# Patient Record
Sex: MALE | Race: WHITE | URBAN - METROPOLITAN AREA
[De-identification: names, ages, dates, MRNs, and addresses within clinical notes are randomized per-mention and may not be internally consistent; named-entity substitution may affect disease eponyms.]

---

## 2017-06-05 ENCOUNTER — DOCTOR'S OFFICE (OUTPATIENT)
Dept: URBAN - METROPOLITAN AREA CLINIC 136 | Facility: CLINIC | Age: 82
Setting detail: OPHTHALMOLOGY
End: 2017-06-05
Payer: COMMERCIAL

## 2017-06-05 DIAGNOSIS — H25.89: ICD-10-CM

## 2017-06-05 DIAGNOSIS — Z96.1: ICD-10-CM

## 2017-06-05 DIAGNOSIS — H35.3131: ICD-10-CM

## 2017-06-05 DIAGNOSIS — H04.121: ICD-10-CM

## 2017-06-05 DIAGNOSIS — H04.122: ICD-10-CM

## 2017-06-05 DIAGNOSIS — H35.371: ICD-10-CM

## 2017-06-05 PROCEDURE — 83861 MICROFLUID ANALY TEARS: CPT | Performed by: OPHTHALMOLOGY

## 2017-06-05 PROCEDURE — 92014 COMPRE OPH EXAM EST PT 1/>: CPT | Performed by: OPHTHALMOLOGY

## 2017-06-05 ASSESSMENT — REFRACTION_OUTSIDERX
OD_VA1: 20/25-2
OD_ADD: +2.50
OS_VA1: 20/25-2
OS_SPHERE: -0.50
OS_AXIS: 10
OS_CYLINDER: +0.50
OD_VA2: 20/25(J1)
OS_VA3: 20/
OD_SPHERE: -0.50
OU_VA: 20/25
OD_CYLINDER: +0.75
OD_AXIS: 115
OS_VA2: 20/25(J1)
OS_ADD: +2.50
OD_VA3: 20/

## 2017-06-05 ASSESSMENT — REFRACTION_AUTOREFRACTION
OS_CYLINDER: -2.00
OS_AXIS: 89
OD_SPHERE: -0.50
OS_SPHERE: +1.00
OD_CYLINDER: -0.75
OD_AXIS: 18

## 2017-06-05 ASSESSMENT — REFRACTION_MANIFEST
OS_VA1: 20/
OS_VA2: 20/
OD_VA3: 20/
OD_VA2: 20/
OD_VA1: 20/
OD_VA3: 20/
OD_VA1: 20/
OS_VA1: 20/
OS_VA3: 20/
OD_VA2: 20/
OS_VA2: 20/
OU_VA: 20/
OS_VA3: 20/
OU_VA: 20/

## 2017-06-05 ASSESSMENT — REFRACTION_CURRENTRX
OS_OVR_VA: 20/
OS_ADD: +3.00
OS_VPRISM_DIRECTION: BF
OD_VPRISM_DIRECTION: BF
OD_CYLINDER: -0.50
OS_OVR_VA: 20/
OD_ADD: +3.00
OD_OVR_VA: 20/
OD_AXIS: 050
OD_SPHERE: +1.50
OS_CYLINDER: -0.50
OS_SPHERE: PLANO
OS_AXIS: 110
OD_OVR_VA: 20/
OD_VPRISM: BD
OS_OVR_VA: 20/
OD_OVR_VA: 20/

## 2017-06-05 ASSESSMENT — LID POSITION - DERMATOCHALASIS
OS_DERMATOCHALASIS: LUL
OD_DERMATOCHALASIS: RUL

## 2017-06-05 ASSESSMENT — DRY EYES - PHYSICIAN NOTES
OD_GENERALCOMMENTS: KSICCA
OS_GENERALCOMMENTS: KSICCA

## 2017-06-05 ASSESSMENT — VISUAL ACUITY
OD_BCVA: 20/40
OS_BCVA: 20/30-1

## 2017-06-05 ASSESSMENT — CONFRONTATIONAL VISUAL FIELD TEST (CVF)
OD_FINDINGS: FULL
OS_FINDINGS: FULL

## 2017-06-05 ASSESSMENT — SPHEQUIV_DERIVED
OD_SPHEQUIV: -0.875
OS_SPHEQUIV: 0

## 2017-06-12 ENCOUNTER — DOCTOR'S OFFICE (OUTPATIENT)
Dept: URBAN - METROPOLITAN AREA CLINIC 137 | Facility: CLINIC | Age: 82
Setting detail: OPHTHALMOLOGY
End: 2017-06-12

## 2017-06-12 DIAGNOSIS — H52.4: ICD-10-CM

## 2017-06-12 PROCEDURE — 92015 DETERMINE REFRACTIVE STATE: CPT | Performed by: OPHTHALMOLOGY

## 2017-06-12 ASSESSMENT — REFRACTION_CURRENTRX
OD_CYLINDER: +0.75
OD_VPRISM_DIRECTION: BF
OS_OVR_VA: 20/
OD_OVR_VA: 20/
OS_SPHERE: -0.50
OD_AXIS: 116
OS_ADD: +2.75
OS_AXIS: 010
OS_OVR_VA: 20/
OS_OVR_VA: 20/
OD_OVR_VA: 20/
OD_OVR_VA: 20/
OS_CYLINDER: +0.50
OD_ADD: +2.75
OS_VPRISM_DIRECTION: BF
OD_SPHERE: -0.50

## 2017-06-12 ASSESSMENT — REFRACTION_OUTSIDERX
OS_CYLINDER: +0.50
OS_ADD: +2.50
OD_VA2: 20/20(J1+)
OD_CYLINDER: +0.75
OD_ADD: +2.75
OS_VA2: 20/25(J1)
OS_CYLINDER: +0.50
OS_VA3: 20/
OD_VA1: 20/20
OD_AXIS: 115
OD_SPHERE: -0.50
OS_SPHERE: -0.50
OD_ADD: +2.50
OS_AXIS: 10
OS_ADD: +2.75
OS_VA1: 20/25-2
OS_VA1: 20/20
OS_VA2: 20/20(J1+)
OS_VA3: 20/
OS_AXIS: 010
OD_SPHERE: -0.50
OD_CYLINDER: +0.75
OD_VA3: 20/
OU_VA: 20/25
OD_VA2: 20/25(J1)
OD_AXIS: 115
OU_VA: 20/
OD_VA3: 20/
OD_VA1: 20/25-2
OS_SPHERE: -0.50

## 2017-06-12 ASSESSMENT — REFRACTION_MANIFEST
OU_VA: 20/
OS_VA1: 20/
OD_VA3: 20/
OD_VA2: 20/
OD_VA1: 20/
OS_VA2: 20/
OS_VA3: 20/

## 2017-06-12 ASSESSMENT — REFRACTION_AUTOREFRACTION
OS_AXIS: 173
OD_SPHERE: -0.75
OS_SPHERE: -0.25
OD_CYLINDER: +1.00
OD_AXIS: 081
OS_CYLINDER: +1.25

## 2017-06-12 ASSESSMENT — KERATOMETRY
OD_AXISANGLE_DEGREES: 171
OD_K1POWER_DIOPTERS: 42.75
OD_K2POWER_DIOPTERS: 45.00
OS_K2POWER_DIOPTERS: 43.25
OS_AXISANGLE_DEGREES: 010
OS_K1POWER_DIOPTERS: 42.75

## 2017-06-12 ASSESSMENT — SPHEQUIV_DERIVED
OS_SPHEQUIV: 0.375
OD_SPHEQUIV: -0.25

## 2017-06-12 ASSESSMENT — VISUAL ACUITY
OD_BCVA: 20/20
OS_BCVA: 20/20-2

## 2017-06-12 ASSESSMENT — AXIALLENGTH_DERIVED
OD_AL: 23.5516
OS_AL: 23.6295

## 2017-06-20 ENCOUNTER — OPTICAL (OUTPATIENT)
Dept: URBAN - METROPOLITAN AREA CLINIC 144 | Facility: CLINIC | Age: 82
Setting detail: OPHTHALMOLOGY
End: 2017-06-20
Payer: COMMERCIAL

## 2017-06-20 DIAGNOSIS — Z96.1: ICD-10-CM

## 2017-06-20 PROCEDURE — V2203 LENS SPHCYL BIFOCAL 4.00D/.1: HCPCS | Performed by: OPHTHALMOLOGY

## 2017-06-30 ENCOUNTER — DOCTOR'S OFFICE (OUTPATIENT)
Dept: URBAN - METROPOLITAN AREA CLINIC 136 | Facility: CLINIC | Age: 82
Setting detail: OPHTHALMOLOGY
End: 2017-06-30
Payer: COMMERCIAL

## 2017-06-30 DIAGNOSIS — H17.9: ICD-10-CM

## 2017-06-30 DIAGNOSIS — H04.121: ICD-10-CM

## 2017-06-30 DIAGNOSIS — H35.3131: ICD-10-CM

## 2017-06-30 DIAGNOSIS — H04.122: ICD-10-CM

## 2017-06-30 DIAGNOSIS — H43.813: ICD-10-CM

## 2017-06-30 DIAGNOSIS — H43.812: ICD-10-CM

## 2017-06-30 DIAGNOSIS — H43.811: ICD-10-CM

## 2017-06-30 DIAGNOSIS — Z96.1: ICD-10-CM

## 2017-06-30 PROCEDURE — 92225 OPHTHALMOSCOPY EXTENDED INITIAL: CPT | Performed by: OPHTHALMOLOGY

## 2017-06-30 PROCEDURE — 92134 CPTRZ OPH DX IMG PST SGM RTA: CPT | Performed by: OPHTHALMOLOGY

## 2017-06-30 PROCEDURE — 92014 COMPRE OPH EXAM EST PT 1/>: CPT | Performed by: OPHTHALMOLOGY

## 2017-06-30 ASSESSMENT — REFRACTION_OUTSIDERX
OD_CYLINDER: +0.75
OS_AXIS: 10
OD_AXIS: 115
OS_CYLINDER: +0.50
OD_VA2: 20/20(J1+)
OS_VA1: 20/20
OS_VA3: 20/
OD_ADD: +2.75
OD_VA2: 20/25(J1)
OD_VA3: 20/
OU_VA: 20/25
OD_VA3: 20/
OS_ADD: +2.75
OD_SPHERE: -0.50
OD_CYLINDER: +0.75
OS_CYLINDER: +0.50
OS_ADD: +2.50
OU_VA: 20/
OS_AXIS: 010
OD_VA1: 20/20
OD_VA1: 20/25-2
OD_AXIS: 115
OD_SPHERE: -0.50
OS_SPHERE: -0.50
OD_ADD: +2.50
OS_SPHERE: -0.50
OS_VA2: 20/20(J1+)
OS_VA1: 20/25-2
OS_VA3: 20/
OS_VA2: 20/25(J1)

## 2017-06-30 ASSESSMENT — REFRACTION_MANIFEST
OS_VA3: 20/
OU_VA: 20/
OS_VA2: 20/
OS_VA1: 20/
OD_VA1: 20/
OD_VA3: 20/
OD_VA2: 20/

## 2017-06-30 ASSESSMENT — CONFRONTATIONAL VISUAL FIELD TEST (CVF)
OD_FINDINGS: FULL
OS_FINDINGS: FULL

## 2017-06-30 ASSESSMENT — SPHEQUIV_DERIVED
OS_SPHEQUIV: 0.375
OD_SPHEQUIV: -0.25

## 2017-06-30 ASSESSMENT — REFRACTION_CURRENTRX
OS_OVR_VA: 20/
OD_OVR_VA: 20/
OD_VPRISM_DIRECTION: BF
OS_VPRISM_DIRECTION: BF
OS_CYLINDER: +0.50
OS_ADD: +2.75
OS_AXIS: 010
OD_OVR_VA: 20/
OD_OVR_VA: 20/
OD_AXIS: 116
OD_SPHERE: -0.50
OD_ADD: +2.75
OS_SPHERE: -0.50
OS_OVR_VA: 20/
OS_OVR_VA: 20/
OD_CYLINDER: +0.75

## 2017-06-30 ASSESSMENT — CORNEAL SURGICAL SCARRING: OD_SCARRING: ANTERIOR STROMAL

## 2017-06-30 ASSESSMENT — REFRACTION_AUTOREFRACTION
OD_AXIS: 081
OS_CYLINDER: +1.25
OD_CYLINDER: +1.00
OS_SPHERE: -0.25
OD_SPHERE: -0.75
OS_AXIS: 173

## 2017-06-30 ASSESSMENT — LID POSITION - DERMATOCHALASIS
OS_DERMATOCHALASIS: LUL
OD_DERMATOCHALASIS: RUL

## 2017-06-30 ASSESSMENT — DRY EYES - PHYSICIAN NOTES
OD_GENERALCOMMENTS: KSICCA
OS_GENERALCOMMENTS: KSICCA

## 2017-06-30 ASSESSMENT — VISUAL ACUITY
OS_BCVA: 20/20-3
OD_BCVA: 20/20-1

## 2017-09-05 ENCOUNTER — ALLSCRIPTS OFFICE VISIT (OUTPATIENT)
Dept: OTHER | Facility: OTHER | Age: 82
End: 2017-09-05

## 2017-09-18 ENCOUNTER — DOCTOR'S OFFICE (OUTPATIENT)
Dept: URBAN - METROPOLITAN AREA CLINIC 137 | Facility: CLINIC | Age: 82
Setting detail: OPHTHALMOLOGY
End: 2017-09-18
Payer: COMMERCIAL

## 2017-09-18 DIAGNOSIS — H17.9: ICD-10-CM

## 2017-09-18 DIAGNOSIS — Z96.1: ICD-10-CM

## 2017-09-18 DIAGNOSIS — H04.123: ICD-10-CM

## 2017-09-18 DIAGNOSIS — H11.31: ICD-10-CM

## 2017-09-18 PROCEDURE — 99213 OFFICE O/P EST LOW 20 MIN: CPT | Performed by: OPHTHALMOLOGY

## 2017-09-18 ASSESSMENT — CONFRONTATIONAL VISUAL FIELD TEST (CVF)
OD_FINDINGS: FULL
OS_FINDINGS: FULL

## 2017-09-18 ASSESSMENT — REFRACTION_OUTSIDERX
OD_ADD: +2.50
OD_SPHERE: -0.50
OD_VA2: 20/20(J1+)
OS_CYLINDER: +0.50
OD_VA1: 20/20
OD_AXIS: 115
OD_VA1: 20/25-2
OS_AXIS: 10
OD_SPHERE: -0.50
OD_AXIS: 115
OS_ADD: +2.75
OS_SPHERE: -0.50
OD_CYLINDER: +0.75
OU_VA: 20/
OS_AXIS: 010
OS_VA3: 20/
OS_ADD: +2.50
OS_VA3: 20/
OD_ADD: +2.75
OS_VA1: 20/25-2
OU_VA: 20/25
OD_VA3: 20/
OD_VA2: 20/25(J1)
OS_VA2: 20/25(J1)
OD_VA3: 20/
OD_CYLINDER: +0.75
OS_SPHERE: -0.50
OS_CYLINDER: +0.50
OS_VA1: 20/20
OS_VA2: 20/20(J1+)

## 2017-09-18 ASSESSMENT — REFRACTION_MANIFEST
OU_VA: 20/
OD_VA2: 20/
OD_VA3: 20/
OS_VA3: 20/
OD_VA1: 20/
OS_VA1: 20/
OS_VA2: 20/

## 2017-09-18 ASSESSMENT — REFRACTION_CURRENTRX
OS_ADD: +2.75
OD_OVR_VA: 20/
OD_ADD: +2.75
OD_SPHERE: -0.50
OS_VPRISM_DIRECTION: BF
OS_OVR_VA: 20/
OS_AXIS: 010
OD_CYLINDER: +0.75
OS_OVR_VA: 20/
OS_OVR_VA: 20/
OD_VPRISM_DIRECTION: BF
OD_AXIS: 116
OD_OVR_VA: 20/
OS_SPHERE: -0.50
OS_CYLINDER: +0.50
OD_OVR_VA: 20/

## 2017-09-18 ASSESSMENT — REFRACTION_AUTOREFRACTION
OS_SPHERE: -0.25
OS_AXIS: 173
OS_CYLINDER: +1.25
OD_AXIS: 081
OD_SPHERE: -0.75
OD_CYLINDER: +1.00

## 2017-09-18 ASSESSMENT — DRY EYES - PHYSICIAN NOTES
OD_GENERALCOMMENTS: KSICCA
OS_GENERALCOMMENTS: KSICCA

## 2017-09-18 ASSESSMENT — VISUAL ACUITY
OS_BCVA: 20/25-2
OD_BCVA: 20/25-2

## 2017-09-18 ASSESSMENT — SPHEQUIV_DERIVED
OD_SPHEQUIV: -0.25
OS_SPHEQUIV: 0.375

## 2017-09-18 ASSESSMENT — LID POSITION - DERMATOCHALASIS
OD_DERMATOCHALASIS: RUL
OS_DERMATOCHALASIS: LUL

## 2017-09-18 ASSESSMENT — CORNEAL SURGICAL SCARRING: OD_SCARRING: ANTERIOR STROMAL

## 2018-01-15 VITALS
WEIGHT: 144 LBS | DIASTOLIC BLOOD PRESSURE: 62 MMHG | HEART RATE: 70 BPM | HEIGHT: 67 IN | RESPIRATION RATE: 16 BRPM | SYSTOLIC BLOOD PRESSURE: 120 MMHG | BODY MASS INDEX: 22.6 KG/M2

## 2018-01-16 NOTE — MISCELLANEOUS
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)    Current Meds   1  AmLODIPine Besylate 5 MG Oral Tablet; Take 1 tablet daily; Last Rx:10Oct2016 Ordered   2  Atenolol 25 MG Oral Tablet; TAKE 1 TABLET DAILY AS DIRECTED; Therapy: (Alba Meyer) to Recorded   3  Doxazosin Mesylate 2 MG Oral Tablet; TAKE 1 TABLET Bedtime; Therapy: [id number] to (Evaluate:08Apr2017)  Requested for: [id number]; Last   Rx:10Oct2016 Ordered   4  Folic Acid 1 MG Oral Tablet; TAKE 1 TABLET DAILY AS DIRECTED; Therapy: (Alba Meyer) to Recorded   5  Lipitor 10 MG Oral Tablet (Atorvastatin Calcium); TAKE 1 TABLET DAILY AS DIRECTED; Therapy: (Alba Meyer) to Recorded   6  Multi-Vitamin TABS; Therapy: (Alba Meyre) to Recorded   7  RaNITidine HCl - 150 MG Oral Capsule; take two capsules once daily; Therapy: (Alba Meyer) to Recorded    Allergies    1  No Known Drug Allergies    2  IV Dye    Signatures   Electronically signed by :  AMY Forman ; Oct 21 2016  9:26AM EST

## 2018-02-26 ENCOUNTER — OFFICE VISIT (OUTPATIENT)
Dept: NEPHROLOGY | Facility: CLINIC | Age: 83
End: 2018-02-26
Payer: MEDICARE

## 2018-02-26 VITALS
SYSTOLIC BLOOD PRESSURE: 140 MMHG | DIASTOLIC BLOOD PRESSURE: 84 MMHG | HEIGHT: 67 IN | WEIGHT: 137.4 LBS | BODY MASS INDEX: 21.56 KG/M2 | HEART RATE: 70 BPM

## 2018-02-26 DIAGNOSIS — N18.9 CHRONIC RENAL IMPAIRMENT, UNSPECIFIED CKD STAGE: Primary | ICD-10-CM

## 2018-02-26 PROCEDURE — 99213 OFFICE O/P EST LOW 20 MIN: CPT | Performed by: INTERNAL MEDICINE

## 2018-02-26 RX ORDER — AMLODIPINE BESYLATE 2.5 MG/1
5 TABLET ORAL DAILY
COMMUNITY
End: 2022-05-12 | Stop reason: SDUPTHER

## 2018-02-26 RX ORDER — FOLIC ACID 1 MG/1
800 TABLET ORAL DAILY
COMMUNITY

## 2018-02-26 RX ORDER — RANITIDINE 15 MG/ML
15 SOLUTION ORAL 2 TIMES DAILY
COMMUNITY
End: 2020-05-28 | Stop reason: CLARIF

## 2018-02-26 RX ORDER — DOXAZOSIN 2 MG/1
2 TABLET ORAL
COMMUNITY
End: 2020-05-28 | Stop reason: CLARIF

## 2018-02-26 RX ORDER — ATORVASTATIN CALCIUM 10 MG/1
10 TABLET, FILM COATED ORAL DAILY
COMMUNITY

## 2018-02-26 RX ORDER — ATENOLOL 50 MG/1
50 TABLET ORAL DAILY
COMMUNITY

## 2018-02-26 NOTE — LETTER
February 26, 2018     Shawna Ferrari DO  [address] [employer]    Patient: Dyllan Rock   YOB: 1930   Date of Visit: 2/26/2018       Dear Dr Lizeth Roman:    Thank you for referring Dyllan Rock to me for evaluation  Below are my notes for this consultation  If you have questions, please do not hesitate to call me  I look forward to following your patient along with you  Sincerely,        Neal Wiggins MD        CC: No Recipients  Neal Wiggins MD  2/26/2018  3:57 PM  Sign at close encounter  [facility]    Dyllan Rock 80 y o  male MRN: [id number]  Unit/Bed#:  Encounter: [id number]  Reason for Consult:  Chronic renal insufficiency    The patient is here for routine follow-up he has been doing well  Since I have last seen and he did undergo urologic procedure and was said to have a lot of scar tissue in the bladder neck and had that dilated  He states presently does not really notice much difference but it could take a couple months to see the full effect  He says his stream is a little bit stronger when he urinates  Otherwise no complaints today  ASSESSMENT/PLAN:  1  Renal     Patient's 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  He has never had an active urine sediment or a lot of proteinuria  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 given his advanced age %period% creatinine is stable at 2 so there has been no significant progression since last seen  At this point continue current medications and monitor urination as hopefully this will improve as the surgical procedure heels  SUBJECTIVE:  Review of Systems   Constitution: Negative for chills, fever and weakness  HENT: Negative  Eyes: Negative  Cardiovascular: Negative  Respiratory: Negative  Gastrointestinal: Negative  Genitourinary: Positive for incomplete emptying  Negative for dysuria         OBJECTIVE:  Current Weight: Weight - Scale: 62 3 kg (137 lb 6 4 oz)  [email or web address]:     Blood pressure 140/84, pulse 70, height 5' 7" (1 702 m), weight 62 3 kg (137 lb 6 4 oz)  , Body mass index is 21 52 kg/m²  [unfilled]    Physical Exam: /84 (BP Location: Right arm, Patient Position: Sitting, Cuff Size: Standard)   Pulse 70   Ht 5' 7" (1 702 m)   Wt 62 3 kg (137 lb 6 4 oz)   BMI 21 52 kg/m²    Physical Exam   Constitutional: He is oriented to person, place, and time  No distress  HENT:   Mouth/Throat: No oropharyngeal exudate  Eyes: No scleral icterus  Neck: Neck supple  No JVD present  Cardiovascular: Normal rate and regular rhythm  Exam reveals no friction rub  No edema   Pulmonary/Chest: Effort normal and breath sounds normal  No respiratory distress  He has no wheezes  He has no rales  Abdominal: Soft  Bowel sounds are normal  He exhibits no distension  There is no tenderness  There is no rebound  Neurological: He is alert and oriented to person, place, and time         Medications:    Current Outpatient Prescriptions:     amLODIPine (NORVASC) 2 5 mg tablet, Take 1 mg by mouth daily, Disp: , Rfl:     atenolol (TENORMIN) 50 mg tablet, Take 50 mg by mouth daily, Disp: , Rfl:     atorvastatin (LIPITOR) 10 mg tablet, Take 10 mg by mouth daily, Disp: , Rfl:     doxazosin (CARDURA) 2 mg tablet, Take 2 mg by mouth daily at bedtime, Disp: , Rfl:     folic acid (FOLVITE) 1 mg tablet, Take 1 mg by mouth daily, Disp: , Rfl:     ranitidine (ZANTAC) 15 mg/mL syrup, Take 15 mg by mouth 2 (two) times a day, Disp: , Rfl:     Laboratory Results:  No results found for: WBC, HGB, HCT, MCV, PLT  No results found for: GLUCOSE, CALCIUM, NA, K, CO2, CL, BUN, CREATININE  No results found for: CALCIUM, PHOS  No results found for: LABPROT

## 2018-02-26 NOTE — PROGRESS NOTES
NEPHROLOGY PROGRESS NOTE    Kurtis Mcduffie 80 y o  male MRN: [id number]  Unit/Bed#:  Encounter: [id number]  Reason for Consult:  Chronic renal insufficiency    The patient is here for routine follow-up he has been doing well  Since I have last seen and he did undergo urologic procedure and was said to have a lot of scar tissue in the bladder neck and had that dilated  He states presently does not really notice much difference but it could take a couple months to see the full effect  He says his stream is a little bit stronger when he urinates  Otherwise no complaints today  ASSESSMENT/PLAN:  1  Renal     Patient's 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  He has never had an active urine sediment or a lot of proteinuria  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 given his advanced age %period% creatinine is stable at 2 so there has been no significant progression since last seen  At this point continue current medications and monitor urination as hopefully this will improve as the surgical procedure heels  SUBJECTIVE:  Review of Systems   Constitution: Negative for chills, fever and weakness  HENT: Negative  Eyes: Negative  Cardiovascular: Negative  Respiratory: Negative  Gastrointestinal: Negative  Genitourinary: Positive for incomplete emptying  Negative for dysuria  OBJECTIVE:  Current Weight: Weight - Scale: 62 3 kg (137 lb 6 4 oz)  [email or web address]:     Blood pressure 140/84, pulse 70, height 5' 7" (1 702 m), weight 62 3 kg (137 lb 6 4 oz)  , Body mass index is 21 52 kg/m²  [unfilled]    Physical Exam: /84 (BP Location: Right arm, Patient Position: Sitting, Cuff Size: Standard)   Pulse 70   Ht 5' 7" (1 702 m)   Wt 62 3 kg (137 lb 6 4 oz)   BMI 21 52 kg/m²   Physical Exam   Constitutional: He is oriented to person, place, and time  No distress  HENT:   Mouth/Throat: No oropharyngeal exudate     Eyes: No scleral icterus  Neck: Neck supple  No JVD present  Cardiovascular: Normal rate and regular rhythm  Exam reveals no friction rub  No edema   Pulmonary/Chest: Effort normal and breath sounds normal  No respiratory distress  He has no wheezes  He has no rales  Abdominal: Soft  Bowel sounds are normal  He exhibits no distension  There is no tenderness  There is no rebound  Neurological: He is alert and oriented to person, place, and time         Medications:    Current Outpatient Prescriptions:     amLODIPine (NORVASC) 2 5 mg tablet, Take 1 mg by mouth daily, Disp: , Rfl:     atenolol (TENORMIN) 50 mg tablet, Take 50 mg by mouth daily, Disp: , Rfl:     atorvastatin (LIPITOR) 10 mg tablet, Take 10 mg by mouth daily, Disp: , Rfl:     doxazosin (CARDURA) 2 mg tablet, Take 2 mg by mouth daily at bedtime, Disp: , Rfl:     folic acid (FOLVITE) 1 mg tablet, Take 1 mg by mouth daily, Disp: , Rfl:     ranitidine (ZANTAC) 15 mg/mL syrup, Take 15 mg by mouth 2 (two) times a day, Disp: , Rfl:     Laboratory Results:  No results found for: WBC, HGB, HCT, MCV, PLT  No results found for: GLUCOSE, CALCIUM, NA, K, CO2, CL, BUN, CREATININE  No results found for: CALCIUM, PHOS  No results found for: LABPROT

## 2018-02-26 NOTE — PATIENT INSTRUCTIONS
As we reviewed your creatinine which is the blood test for the kidney function was 2 and that has been stable at your baseline so overall it is not worse  Blood pressure is acceptable continue current medications and follow-up as scheduled

## 2018-03-01 DIAGNOSIS — N18.9 CHRONIC KIDNEY DISEASE: ICD-10-CM

## 2018-03-19 ENCOUNTER — DOCTOR'S OFFICE (OUTPATIENT)
Dept: URBAN - METROPOLITAN AREA CLINIC 136 | Facility: CLINIC | Age: 83
Setting detail: OPHTHALMOLOGY
End: 2018-03-19
Payer: COMMERCIAL

## 2018-03-19 DIAGNOSIS — H04.123: ICD-10-CM

## 2018-03-19 DIAGNOSIS — H25.89: ICD-10-CM

## 2018-03-19 DIAGNOSIS — H35.3131: ICD-10-CM

## 2018-03-19 DIAGNOSIS — H43.813: ICD-10-CM

## 2018-03-19 DIAGNOSIS — H04.122: ICD-10-CM

## 2018-03-19 DIAGNOSIS — H04.121: ICD-10-CM

## 2018-03-19 PROBLEM — H11.31 SUBCONJUCTIVAL HEMORRHAGE; RIGHT EYE: Status: RESOLVED | Noted: 2017-09-18 | Resolved: 2018-03-19

## 2018-03-19 PROCEDURE — 92134 CPTRZ OPH DX IMG PST SGM RTA: CPT | Performed by: OPHTHALMOLOGY

## 2018-03-19 PROCEDURE — 92250 FUNDUS PHOTOGRAPHY W/I&R: CPT | Performed by: OPHTHALMOLOGY

## 2018-03-19 PROCEDURE — 83861 MICROFLUID ANALY TEARS: CPT | Performed by: OPHTHALMOLOGY

## 2018-03-19 PROCEDURE — 92014 COMPRE OPH EXAM EST PT 1/>: CPT | Performed by: OPHTHALMOLOGY

## 2018-03-19 ASSESSMENT — REFRACTION_CURRENTRX
OD_CYLINDER: +0.75
OD_ADD: +2.75
OS_SPHERE: -0.50
OD_VPRISM_DIRECTION: BF
OD_OVR_VA: 20/
OD_AXIS: 116
OD_OVR_VA: 20/
OS_OVR_VA: 20/
OD_OVR_VA: 20/
OS_CYLINDER: +0.50
OS_VPRISM_DIRECTION: BF
OS_OVR_VA: 20/
OS_AXIS: 010
OS_ADD: +2.75
OD_SPHERE: -0.50
OS_OVR_VA: 20/

## 2018-03-19 ASSESSMENT — REFRACTION_AUTOREFRACTION
OD_AXIS: 081
OS_CYLINDER: +1.25
OD_CYLINDER: +1.00
OD_SPHERE: -0.75
OS_SPHERE: -0.25
OS_AXIS: 173

## 2018-03-19 ASSESSMENT — CORNEAL SURGICAL SCARRING: OD_SCARRING: ANTERIOR STROMAL

## 2018-03-19 ASSESSMENT — DRY EYES - PHYSICIAN NOTES
OS_GENERALCOMMENTS: KSICCA
OD_GENERALCOMMENTS: KSICCA

## 2018-03-19 ASSESSMENT — REFRACTION_OUTSIDERX
OS_AXIS: 010
OS_ADD: +2.75
OS_VA3: 20/
OU_VA: 20/
OS_CYLINDER: +0.50
OD_VA2: 20/25(J1)
OS_VA1: 20/20
OS_SPHERE: -0.50
OS_AXIS: 10
OD_VA1: 20/25-2
OS_ADD: +2.50
OD_ADD: +2.50
OS_VA1: 20/25-2
OD_SPHERE: -0.50
OD_AXIS: 115
OD_SPHERE: -0.50
OD_VA1: 20/20
OD_AXIS: 115
OS_VA2: 20/20(J1+)
OU_VA: 20/25
OS_SPHERE: -0.50
OD_CYLINDER: +0.75
OD_ADD: +2.75
OD_CYLINDER: +0.75
OD_VA3: 20/
OS_VA2: 20/25(J1)
OD_VA2: 20/20(J1+)
OD_VA3: 20/
OS_VA3: 20/
OS_CYLINDER: +0.50

## 2018-03-19 ASSESSMENT — LID POSITION - DERMATOCHALASIS
OD_DERMATOCHALASIS: RUL
OS_DERMATOCHALASIS: LUL

## 2018-03-19 ASSESSMENT — VISUAL ACUITY
OD_BCVA: 20/25-2
OS_BCVA: 20/25-2

## 2018-03-19 ASSESSMENT — SUPERFICIAL PUNCTATE KERATITIS (SPK)
OD_SPK: T
OS_SPK: T

## 2018-03-19 ASSESSMENT — SPHEQUIV_DERIVED
OS_SPHEQUIV: 0.375
OD_SPHEQUIV: -0.25

## 2018-03-19 ASSESSMENT — CONFRONTATIONAL VISUAL FIELD TEST (CVF)
OS_FINDINGS: FULL
OD_FINDINGS: FULL

## 2018-03-19 ASSESSMENT — REFRACTION_MANIFEST
OD_VA2: 20/
OS_VA1: 20/
OD_VA1: 20/
OS_VA2: 20/
OD_VA3: 20/
OU_VA: 20/
OS_VA3: 20/

## 2018-06-28 ENCOUNTER — DOCTOR'S OFFICE (OUTPATIENT)
Dept: URBAN - METROPOLITAN AREA CLINIC 136 | Facility: CLINIC | Age: 83
Setting detail: OPHTHALMOLOGY
End: 2018-06-28
Payer: COMMERCIAL

## 2018-06-28 DIAGNOSIS — H04.123: ICD-10-CM

## 2018-06-28 DIAGNOSIS — H17.9: ICD-10-CM

## 2018-06-28 DIAGNOSIS — H25.89: ICD-10-CM

## 2018-06-28 DIAGNOSIS — Z96.1: ICD-10-CM

## 2018-06-28 DIAGNOSIS — H43.813: ICD-10-CM

## 2018-06-28 DIAGNOSIS — H35.3132: ICD-10-CM

## 2018-06-28 PROBLEM — H04.121 DRY EYE; RIGHT EYE, LEFT EYE: Status: ACTIVE | Noted: 2017-06-05

## 2018-06-28 PROBLEM — H04.122 DRY EYE; RIGHT EYE, LEFT EYE: Status: ACTIVE | Noted: 2017-06-05

## 2018-06-28 PROBLEM — H35.379 EPIRETINAL MEMBRANE: Status: ACTIVE | Noted: 2018-06-28

## 2018-06-28 PROCEDURE — 92014 COMPRE OPH EXAM EST PT 1/>: CPT | Performed by: OPHTHALMOLOGY

## 2018-06-28 PROCEDURE — 92134 CPTRZ OPH DX IMG PST SGM RTA: CPT | Performed by: OPHTHALMOLOGY

## 2018-06-28 ASSESSMENT — CORNEAL SURGICAL SCARRING: OD_SCARRING: ANTERIOR STROMAL

## 2018-06-28 ASSESSMENT — LID POSITION - DERMATOCHALASIS
OD_DERMATOCHALASIS: RUL
OS_DERMATOCHALASIS: LUL

## 2018-06-28 ASSESSMENT — SUPERFICIAL PUNCTATE KERATITIS (SPK)
OD_SPK: T
OS_SPK: T

## 2018-06-28 ASSESSMENT — CONFRONTATIONAL VISUAL FIELD TEST (CVF)
OD_FINDINGS: FULL
OS_FINDINGS: FULL

## 2018-07-01 ASSESSMENT — REFRACTION_MANIFEST
OS_VA1: 20/
OD_VA1: 20/
OD_VA2: 20/
OU_VA: 20/
OS_VA3: 20/
OD_VA3: 20/
OS_VA2: 20/

## 2018-07-01 ASSESSMENT — REFRACTION_OUTSIDERX
OD_SPHERE: -0.50
OS_AXIS: 10
OS_VA3: 20/
OU_VA: 20/25
OD_AXIS: 115
OD_VA3: 20/
OD_ADD: +2.75
OD_SPHERE: -0.50
OU_VA: 20/
OD_CYLINDER: +0.75
OD_VA3: 20/
OS_VA1: 20/20
OD_VA2: 20/25(J1)
OS_SPHERE: -0.50
OD_CYLINDER: +0.75
OD_ADD: +2.50
OS_VA2: 20/25(J1)
OS_VA3: 20/
OS_VA2: 20/20(J1+)
OD_AXIS: 115
OS_SPHERE: -0.50
OS_ADD: +2.75
OS_VA1: 20/25-2
OD_VA1: 20/20
OS_CYLINDER: +0.50
OD_VA1: 20/25-2
OS_ADD: +2.50
OS_CYLINDER: +0.50
OS_AXIS: 010
OD_VA2: 20/20(J1+)

## 2018-07-01 ASSESSMENT — REFRACTION_AUTOREFRACTION
OS_SPHERE: -0.25
OD_AXIS: 081
OS_CYLINDER: +1.25
OS_AXIS: 173
OD_CYLINDER: +1.00
OD_SPHERE: -0.75

## 2018-07-01 ASSESSMENT — REFRACTION_CURRENTRX
OD_OVR_VA: 20/
OD_OVR_VA: 20/
OD_ADD: +2.75
OD_CYLINDER: +0.75
OD_OVR_VA: 20/
OS_OVR_VA: 20/
OS_SPHERE: -0.50
OS_AXIS: 010
OS_CYLINDER: +0.50
OS_ADD: +2.75
OS_OVR_VA: 20/
OS_VPRISM_DIRECTION: BF
OD_VPRISM_DIRECTION: BF
OS_OVR_VA: 20/
OD_AXIS: 116
OD_SPHERE: -0.50

## 2018-07-01 ASSESSMENT — SPHEQUIV_DERIVED
OS_SPHEQUIV: 0.375
OD_SPHEQUIV: -0.25

## 2018-07-01 ASSESSMENT — VISUAL ACUITY
OS_BCVA: 20/30-2
OD_BCVA: 20/25-2

## 2018-09-24 ENCOUNTER — OFFICE VISIT (OUTPATIENT)
Dept: NEPHROLOGY | Facility: CLINIC | Age: 83
End: 2018-09-24
Payer: MEDICARE

## 2018-09-24 VITALS
WEIGHT: 132.2 LBS | HEIGHT: 67 IN | SYSTOLIC BLOOD PRESSURE: 130 MMHG | DIASTOLIC BLOOD PRESSURE: 58 MMHG | HEART RATE: 70 BPM | BODY MASS INDEX: 20.75 KG/M2

## 2018-09-24 DIAGNOSIS — N18.9 CHRONIC RENAL IMPAIRMENT, UNSPECIFIED CKD STAGE: Primary | ICD-10-CM

## 2018-09-24 PROCEDURE — 99213 OFFICE O/P EST LOW 20 MIN: CPT | Performed by: INTERNAL MEDICINE

## 2018-09-24 NOTE — PATIENT INSTRUCTIONS
As we reviewed your creatinine was 2 which over the last couple years is a tiny bit higher but we discussed that you do not have signs of aggressive kidney disease in you likely have aging or nephrosclerosis in your solitary kidney  Your blood pressure is under good control your on treatment for lipids so these are the measures to help delay damage and so far it is not progressing quickly  Continue current medications and follow-up as scheduled

## 2018-09-24 NOTE — PROGRESS NOTES
NEPHROLOGY PROGRESS NOTE    Adriel Levin 80 y o  male MRN: [id number]  Unit/Bed#:  Encounter: [id number]  Reason for Consult:  Chronic renal insufficiency    Patient is doing well states that he thought he ate something given little bit of GI upset and also he has some dysphagia and that is been evaluated by GI and told he had some dysmotility  Other than that doing well in good spirits  ASSESSMENT/PLAN:  1  Renal  Patient's chronic renal insufficiency in congenital solitary kidney with no proteinuria so it is likely due to nephrosclerosis  There is no signs of aggressive kidney disease given lack of proteinuria  For now creatinine is 2 1 which is relatively stable over last couple years it has increased from 1 9 but it is rate is very slow  He also has issues with bladder emptying but states that he does not have large residuals but still has some issues with incontinence  Continue current medications blood pressure is excellent on lipid therapy and continue to monitor periodically  SUBJECTIVE:  Review of Systems   Constitution: Negative  HENT: Negative  Eyes: Negative  Cardiovascular: Negative  Respiratory: Negative  Gastrointestinal: Negative for abdominal pain  Appetite is improving  Genitourinary: Negative for dysuria and hematuria  Slight incontinence and nocturia  Neurological: Negative  OBJECTIVE:  Current Weight: Weight - Scale: 60 kg (132 lb 3 2 oz)  [email or web address]:     Blood pressure 130/58, pulse 70, height 5' 7" (1 702 m), weight 60 kg (132 lb 3 2 oz)  , Body mass index is 20 71 kg/m²  [unfilled]    Physical Exam: /58 (BP Location: Left arm, Patient Position: Sitting, Cuff Size: Standard)   Pulse 70   Ht 5' 7" (1 702 m)   Wt 60 kg (132 lb 3 2 oz)   BMI 20 71 kg/m²   Physical Exam   Constitutional: He is oriented to person, place, and time  No distress  HENT:   Mouth/Throat: No oropharyngeal exudate  Eyes: No scleral icterus  Neck: Neck supple  No JVD present  Cardiovascular: Normal rate  Exam reveals no friction rub  No edema  Pulmonary/Chest: Effort normal and breath sounds normal  No respiratory distress  He has no wheezes  He has no rales  Abdominal: Soft  Bowel sounds are normal  He exhibits no distension  There is no tenderness  There is no rebound  Neurological: He is alert and oriented to person, place, and time         Medications:    Current Outpatient Prescriptions:     amLODIPine (NORVASC) 2 5 mg tablet, Take 1 mg by mouth daily, Disp: , Rfl:     atenolol (TENORMIN) 50 mg tablet, Take 50 mg by mouth daily, Disp: , Rfl:     atorvastatin (LIPITOR) 10 mg tablet, Take 10 mg by mouth daily, Disp: , Rfl:     folic acid (FOLVITE) 1 mg tablet, Take 1 mg by mouth daily, Disp: , Rfl:     ranitidine (ZANTAC) 15 mg/mL syrup, Take 15 mg by mouth 2 (two) times a day, Disp: , Rfl:     doxazosin (CARDURA) 2 mg tablet, Take 2 mg by mouth daily at bedtime, Disp: , Rfl:     Laboratory Results:  No results found for: WBC, HGB, HCT, MCV, PLT  No results found for: GLUCOSE, CALCIUM, NA, K, CO2, CL, BUN, CREATININE  No results found for: CALCIUM, PHOS  No results found for: LABPROT

## 2018-09-24 NOTE — LETTER
September 24, 2018     Miladys Hull DO  [address] [place] [address]    Patient: Eleanor Dotson   YOB: 1930   Date of Visit: 9/24/2018       Dear Dr Teresa Patel:    Thank you for referring Eleanor Dotson to me for evaluation  Below are my notes for this consultation  If you have questions, please do not hesitate to call me  I look forward to following your patient along with you  Sincerely,        Yany Vigil MD        CC: No Recipients  Yany Vigil MD  9/24/2018 10:46 AM  Sign at close encounter  [facility]    Eleanor Dotson 80 y o  male MRN: [id number]  Unit/Bed#:  Encounter: [id number]  Reason for Consult:  Chronic renal insufficiency    Patient is doing well states that he thought he ate something given little bit of GI upset and also he has some dysphagia and that is been evaluated by GI and told he had some dysmotility  Other than that doing well in good spirits  ASSESSMENT/PLAN:  1  Renal  Patient's chronic renal insufficiency in congenital solitary kidney with no proteinuria so it is likely due to nephrosclerosis  There is no signs of aggressive kidney disease given lack of proteinuria  For now creatinine is 2 1 which is relatively stable over last couple years it has increased from 1 9 but it is rate is very slow  He also has issues with bladder emptying but states that he does not have large residuals but still has some issues with incontinence  Continue current medications blood pressure is excellent on lipid therapy and continue to monitor periodically  SUBJECTIVE:  Review of Systems   Constitution: Negative  HENT: Negative  Eyes: Negative  Cardiovascular: Negative  Respiratory: Negative  Gastrointestinal: Negative for abdominal pain  Appetite is improving  Genitourinary: Negative for dysuria and hematuria  Slight incontinence and nocturia  Neurological: Negative          OBJECTIVE:  Current Weight: Weight - Scale: 60 kg (132 lb 3 2 oz)  [email or web address]:     Blood pressure 130/58, pulse 70, height 5' 7" (1 702 m), weight 60 kg (132 lb 3 2 oz)  , Body mass index is 20 71 kg/m²  [unfilled]    Physical Exam: /58 (BP Location: Left arm, Patient Position: Sitting, Cuff Size: Standard)   Pulse 70   Ht 5' 7" (1 702 m)   Wt 60 kg (132 lb 3 2 oz)   BMI 20 71 kg/m²    Physical Exam   Constitutional: He is oriented to person, place, and time  No distress  HENT:   Mouth/Throat: No oropharyngeal exudate  Eyes: No scleral icterus  Neck: Neck supple  No JVD present  Cardiovascular: Normal rate  Exam reveals no friction rub  No edema  Pulmonary/Chest: Effort normal and breath sounds normal  No respiratory distress  He has no wheezes  He has no rales  Abdominal: Soft  Bowel sounds are normal  He exhibits no distension  There is no tenderness  There is no rebound  Neurological: He is alert and oriented to person, place, and time         Medications:    Current Outpatient Prescriptions:     amLODIPine (NORVASC) 2 5 mg tablet, Take 1 mg by mouth daily, Disp: , Rfl:     atenolol (TENORMIN) 50 mg tablet, Take 50 mg by mouth daily, Disp: , Rfl:     atorvastatin (LIPITOR) 10 mg tablet, Take 10 mg by mouth daily, Disp: , Rfl:     folic acid (FOLVITE) 1 mg tablet, Take 1 mg by mouth daily, Disp: , Rfl:     ranitidine (ZANTAC) 15 mg/mL syrup, Take 15 mg by mouth 2 (two) times a day, Disp: , Rfl:     doxazosin (CARDURA) 2 mg tablet, Take 2 mg by mouth daily at bedtime, Disp: , Rfl:     Laboratory Results:  No results found for: WBC, HGB, HCT, MCV, PLT  No results found for: GLUCOSE, CALCIUM, NA, K, CO2, CL, BUN, CREATININE  No results found for: CALCIUM, PHOS  No results found for: LABPROT

## 2019-03-11 ENCOUNTER — OFFICE VISIT (OUTPATIENT)
Dept: NEPHROLOGY | Facility: CLINIC | Age: 84
End: 2019-03-11
Payer: MEDICARE

## 2019-03-11 ENCOUNTER — TELEPHONE (OUTPATIENT)
Dept: NEPHROLOGY | Facility: HOSPITAL | Age: 84
End: 2019-03-11

## 2019-03-11 VITALS
HEIGHT: 67 IN | HEART RATE: 70 BPM | WEIGHT: 134.4 LBS | BODY MASS INDEX: 21.09 KG/M2 | SYSTOLIC BLOOD PRESSURE: 136 MMHG | DIASTOLIC BLOOD PRESSURE: 74 MMHG

## 2019-03-11 DIAGNOSIS — N18.9 CHRONIC RENAL IMPAIRMENT, UNSPECIFIED CKD STAGE: Primary | ICD-10-CM

## 2019-03-11 PROCEDURE — 99213 OFFICE O/P EST LOW 20 MIN: CPT | Performed by: INTERNAL MEDICINE

## 2019-03-11 RX ORDER — ALLOPURINOL 100 MG/1
100 TABLET ORAL DAILY
COMMUNITY
End: 2021-04-07 | Stop reason: ALTCHOICE

## 2019-03-11 NOTE — PATIENT INSTRUCTIONS
You are here for follow-up your creatinine was 2 4 when last checked that is a little bit above previous baseline but he said your trying to drink a little bit more water cause he might be a little dehydrated  You have 1 kidney so he likely have a little aging or nephrosclerosis and that kidney you are to have her kidney ultrasound in the near future have inform me the results  Other than that blood pressure is good continue current medications

## 2019-03-11 NOTE — LETTER
March 11, 2019     Tia Bland DO  [address] [place] [address]    Patient: Faraz Watkins   YOB: 1930   Date of Visit: 3/11/2019       Dear Dr Karry Phoenix:    Thank you for referring Faraz Watkins to me for evaluation  Below are my notes for this consultation  If you have questions, please do not hesitate to call me  I look forward to following your patient along with you  Sincerely,        Demetrio Rodgers MD        CC: MD Demetrio Hoffman MD  3/11/2019 11:18 AM  Sign at close encounter  [facility]    Faraz Watkins 80 y o  male MRN: [id number]  Unit/Bed#:  Encounter: [id number]  Reason for Consult:  Chronic kidney disease    The patient is here for routine follow-up since I have seen him he has been seeing Hematology for thrombocytopenia  He says he has had extensive workup including blood work in bone marrow biopsy and was told that everything is stable and he was told that it did not really show any serious condition  ASSESSMENT/PLAN:  1  Renal    The patient has chronic kidney disease in a congenital solitary kidney  His baseline creatinine tended to be around 2  It looks like in January had level 2 6 and then his recent 1 in February was 2 4 and I am going to be obtaining the 1 from March  He does not seem to have any urinary issues with bladder emptying in terms of his history  He is not taking any other new medications no anti-inflammatories  He was told that he just needs to drink more water so potentially was little volume depleted  I will obtain these lab results and then monitor  He also tells me he is scheduled to have a renal ultrasound I have asked him to for that result to me  The ultrasound will hopefully rule out any evidence of obstruction  2  Hypertension    Blood pressure is well controlled on current medications  No changes  SUBJECTIVE:  Review of Systems   Constitution: Negative for chills and fever  HENT: Negative      Eyes: Negative  Cardiovascular: Negative  Negative for chest pain, dyspnea on exertion, leg swelling and orthopnea  Respiratory: Negative  Negative for cough and shortness of breath  Gastrointestinal: Negative  Genitourinary: Negative  Neurological: Negative  OBJECTIVE:  Current Weight: Weight - Scale: 61 kg (134 lb 6 4 oz)  [email or web address]:     Blood pressure 136/74, pulse 70, height 5' 7" (1 702 m), weight 61 kg (134 lb 6 4 oz)  , Body mass index is 21 05 kg/m²  [unfilled]    Physical Exam: /74 (BP Location: Left arm, Patient Position: Sitting, Cuff Size: Standard)   Pulse 70   Ht 5' 7" (1 702 m)   Wt 61 kg (134 lb 6 4 oz)   BMI 21 05 kg/m²    Physical Exam   Constitutional: He is oriented to person, place, and time  No distress  HENT:   Head: Atraumatic  Eyes: No scleral icterus  Neck: Neck supple  No JVD present  Cardiovascular: Normal rate and regular rhythm  Exam reveals no friction rub  No edema  Pulmonary/Chest: Effort normal and breath sounds normal  No respiratory distress  He has no wheezes  He has no rales  Abdominal: Soft  Bowel sounds are normal  He exhibits no distension  There is no tenderness  Neurological: He is alert and oriented to person, place, and time         Medications:    Current Outpatient Medications:     allopurinol (ZYLOPRIM) 100 mg tablet, Take 100 mg by mouth daily, Disp: , Rfl:     amLODIPine (NORVASC) 2 5 mg tablet, Take 1 mg by mouth daily, Disp: , Rfl:     atenolol (TENORMIN) 50 mg tablet, Take 50 mg by mouth daily, Disp: , Rfl:     atorvastatin (LIPITOR) 10 mg tablet, Take 10 mg by mouth daily, Disp: , Rfl:     folic acid (FOLVITE) 1 mg tablet, Take 1 mg by mouth daily, Disp: , Rfl:     ranitidine (ZANTAC) 15 mg/mL syrup, Take 15 mg by mouth 2 (two) times a day, Disp: , Rfl:     doxazosin (CARDURA) 2 mg tablet, Take 2 mg by mouth daily at bedtime, Disp: , Rfl:     Laboratory Results:  No results found for: WBC, HGB, HCT, MCV, PLT  No results found for: GLUCOSE, CALCIUM, NA, K, CO2, CL, BUN, CREATININE  No results found for: CALCIUM, PHOS  No results found for: LABPROT

## 2019-03-11 NOTE — PROGRESS NOTES
NEPHROLOGY PROGRESS NOTE    Vazqeuz Grant 80 y o  male MRN: [id number]  Unit/Bed#:  Encounter: [id number]  Reason for Consult:  Chronic kidney disease    The patient is here for routine follow-up since I have seen him he has been seeing Hematology for thrombocytopenia  He says he has had extensive workup including blood work in bone marrow biopsy and was told that everything is stable and he was told that it did not really show any serious condition  ASSESSMENT/PLAN:  1  Renal    The patient has chronic kidney disease in a congenital solitary kidney  His baseline creatinine tended to be around 2  It looks like in January had level 2 6 and then his recent 1 in February was 2 4 and I am going to be obtaining the 1 from March  He does not seem to have any urinary issues with bladder emptying in terms of his history  He is not taking any other new medications no anti-inflammatories  He was told that he just needs to drink more water so potentially was little volume depleted  I will obtain these lab results and then monitor  He also tells me he is scheduled to have a renal ultrasound I have asked him to for that result to me  The ultrasound will hopefully rule out any evidence of obstruction  2  Hypertension    Blood pressure is well controlled on current medications  No changes  SUBJECTIVE:  Review of Systems   Constitution: Negative for chills and fever  HENT: Negative  Eyes: Negative  Cardiovascular: Negative  Negative for chest pain, dyspnea on exertion, leg swelling and orthopnea  Respiratory: Negative  Negative for cough and shortness of breath  Gastrointestinal: Negative  Genitourinary: Negative  Neurological: Negative  OBJECTIVE:  Current Weight: Weight - Scale: 61 kg (134 lb 6 4 oz)  [email or web address]:     Blood pressure 136/74, pulse 70, height 5' 7" (1 702 m), weight 61 kg (134 lb 6 4 oz)  , Body mass index is 21 05 kg/m²      [unfilled]    Physical Exam: BP 136/74 (BP Location: Left arm, Patient Position: Sitting, Cuff Size: Standard)   Pulse 70   Ht 5' 7" (1 702 m)   Wt 61 kg (134 lb 6 4 oz)   BMI 21 05 kg/m²   Physical Exam   Constitutional: He is oriented to person, place, and time  No distress  HENT:   Head: Atraumatic  Eyes: No scleral icterus  Neck: Neck supple  No JVD present  Cardiovascular: Normal rate and regular rhythm  Exam reveals no friction rub  No edema  Pulmonary/Chest: Effort normal and breath sounds normal  No respiratory distress  He has no wheezes  He has no rales  Abdominal: Soft  Bowel sounds are normal  He exhibits no distension  There is no tenderness  Neurological: He is alert and oriented to person, place, and time         Medications:    Current Outpatient Medications:     allopurinol (ZYLOPRIM) 100 mg tablet, Take 100 mg by mouth daily, Disp: , Rfl:     amLODIPine (NORVASC) 2 5 mg tablet, Take 1 mg by mouth daily, Disp: , Rfl:     atenolol (TENORMIN) 50 mg tablet, Take 50 mg by mouth daily, Disp: , Rfl:     atorvastatin (LIPITOR) 10 mg tablet, Take 10 mg by mouth daily, Disp: , Rfl:     folic acid (FOLVITE) 1 mg tablet, Take 1 mg by mouth daily, Disp: , Rfl:     ranitidine (ZANTAC) 15 mg/mL syrup, Take 15 mg by mouth 2 (two) times a day, Disp: , Rfl:     doxazosin (CARDURA) 2 mg tablet, Take 2 mg by mouth daily at bedtime, Disp: , Rfl:     Laboratory Results:  No results found for: WBC, HGB, HCT, MCV, PLT  No results found for: GLUCOSE, CALCIUM, NA, K, CO2, CL, BUN, CREATININE  No results found for: CALCIUM, PHOS  No results found for: LABPROT

## 2019-03-11 NOTE — TELEPHONE ENCOUNTER
----- Message from Zackery Almazan MD sent at 3/11/2019 11:57 AM EDT -----  Please contact Dr Ginny Barajas office and obtain his last note on the patient, bone marrow biopsy result, and labs from March of this year

## 2019-04-02 LAB
BILIRUB DIRECT SERPL-MCNC: 0.1 MG/DL (ref 0–0.2)
CREAT 24H UR-MRATE: 0.77 G/(24.H)
EXT GLUCOSE BLD: 81
EXTERNAL ALBUMIN: 3.9
EXTERNAL ALK PHOS: 122
EXTERNAL ALT: 18
EXTERNAL ANION GAP: 11
EXTERNAL AST: 20
EXTERNAL BICARBONATE: 19
EXTERNAL BUN: 38
EXTERNAL CALCIUM: 8.3
EXTERNAL CHLORIDE: 116
EXTERNAL CREATININE: 2.5
EXTERNAL EGFR: 22
EXTERNAL MAGNESIUM: 1.9
EXTERNAL PHOSPHORUS: 4
EXTERNAL POTASSIUM: 5
EXTERNAL SODIUM: 146
EXTERNAL T.BILIRUBIN: 0.3
EXTERNAL TOTAL PROTEIN: 6.8
EXTERNAL URIC ACID: 6
GLUCOSE UR STRIP-MCNC: NEGATIVE MG/DL
HGB UR QL STRIP.AUTO: NEGATIVE
IGA SERPL-MCNC: 147 MG/DL
IGG1 SER-MCNC: 1157 MG/DL
IRON SATN MFR SERPL: 29 %
IRON SERPL-MCNC: 70 UG/DL (ref 65–175)
KAPPA LC FREE SER-MCNC: 104.84 MG/L
KAPPA LC FREE/LAMBDA FREE SER: 2.87 {RATIO}
KETONES UR STRIP-MCNC: NEGATIVE MG/DL
LAMBDA LC FREE SERPL-MCNC: 36.52 MG/L
LDH SERPL-CCNC: 261 U/L (ref 81–234)
LEUKOCYTE ESTERASE UR QL STRIP: NEGATIVE
NITRITE UR QL STRIP: NEGATIVE
PG IGM SER-ACNC: 81
PH UR STRIP.AUTO: 5 [PH] (ref 4.5–8)
PROT UR STRIP-MCNC: >300 MG/DL
RBC #/AREA URNS AUTO: ABNORMAL /HPF
SP GR UR STRIP.AUTO: 1.02 (ref 1–1.03)
TIBC SERPL-MCNC: 241 UG/DL (ref 250–450)
TRANSFERRIN SERPL-MCNC: 191 MG/DL (ref 200–400)
WBC #/AREA URNS AUTO: ABNORMAL /HPF

## 2019-04-10 ENCOUNTER — TELEPHONE (OUTPATIENT)
Dept: NEPHROLOGY | Facility: CLINIC | Age: 84
End: 2019-04-10

## 2019-04-17 ENCOUNTER — OFFICE VISIT (OUTPATIENT)
Dept: NEPHROLOGY | Facility: CLINIC | Age: 84
End: 2019-04-17
Payer: MEDICARE

## 2019-04-17 VITALS
HEIGHT: 67 IN | BODY MASS INDEX: 21.16 KG/M2 | SYSTOLIC BLOOD PRESSURE: 156 MMHG | DIASTOLIC BLOOD PRESSURE: 60 MMHG | WEIGHT: 134.8 LBS | HEART RATE: 58 BPM

## 2019-04-17 DIAGNOSIS — N18.4 CKD (CHRONIC KIDNEY DISEASE) STAGE 4, GFR 15-29 ML/MIN (HCC): Primary | ICD-10-CM

## 2019-04-17 PROCEDURE — 99213 OFFICE O/P EST LOW 20 MIN: CPT | Performed by: NURSE PRACTITIONER

## 2019-04-17 RX ORDER — THIAMINE MONONITRATE (VIT B1) 100 MG
100 TABLET ORAL DAILY
COMMUNITY
End: 2021-04-07 | Stop reason: ALTCHOICE

## 2019-04-17 RX ORDER — RANITIDINE 150 MG/1
150 TABLET ORAL 2 TIMES DAILY
COMMUNITY
End: 2020-02-04 | Stop reason: CLARIF

## 2019-04-17 RX ORDER — MELATONIN
1000 DAILY
COMMUNITY

## 2019-07-10 ENCOUNTER — OFFICE VISIT (OUTPATIENT)
Dept: URGENT CARE | Facility: CLINIC | Age: 84
End: 2019-07-10
Payer: MEDICARE

## 2019-07-10 VITALS
TEMPERATURE: 97.8 F | HEART RATE: 68 BPM | BODY MASS INDEX: 21.19 KG/M2 | SYSTOLIC BLOOD PRESSURE: 182 MMHG | DIASTOLIC BLOOD PRESSURE: 78 MMHG | WEIGHT: 135 LBS | RESPIRATION RATE: 16 BRPM | HEIGHT: 67 IN

## 2019-07-10 DIAGNOSIS — S81.812A SKIN TEAR OF LEFT LOWER LEG WITHOUT COMPLICATION, INITIAL ENCOUNTER: Primary | ICD-10-CM

## 2019-07-10 PROCEDURE — 90715 TDAP VACCINE 7 YRS/> IM: CPT

## 2019-07-10 PROCEDURE — G0463 HOSPITAL OUTPT CLINIC VISIT: HCPCS | Performed by: NURSE PRACTITIONER

## 2019-07-10 PROCEDURE — 99203 OFFICE O/P NEW LOW 30 MIN: CPT | Performed by: NURSE PRACTITIONER

## 2019-07-10 NOTE — PATIENT INSTRUCTIONS
Keep area clean and dry   Change dressing tomorrow   Wash with soap and water   You can apply antibiotic ointment 2 times a day with dressing changes  Tylenol as needed for pain   Follow up with your PCP for worsening or concerning symptoms   Tetanus updated today     Skin Tear   WHAT YOU NEED TO KNOW:   A skin tear occurs when the layers of weakened skin split open from an injury  , elderly, and chronically or critically ill people are at higher risk for skin tears  Long-term use of steroids can also increase the risk  It is important to treat and prevent skin tears to prevent infection  DISCHARGE INSTRUCTIONS:   Medicines:   · Medicines  may be given to reduce your pain or to treat or prevent an infection  Do not wait until the pain is severe before you ask for more medicine  · Take your medicine as directed  Contact your healthcare provider if you think your medicine is not helping or if you have side effects  Tell him of her if you are allergic to any medicine  Keep a list of the medicines, vitamins, and herbs you take  Include the amounts, and when and why you take them  Bring the list or the pill bottles to follow-up visits  Carry your medicine list with you in case of an emergency  Follow up with your healthcare provider as directed:  Write down your questions so you remember to ask them during your visits  Wound care: You may be referred to a wound specialist who will teach you how to clean your wound properly  Ask for more information about wound care  Prevent another skin tear:   · Clean, moisturize, and protect your skin  Baths, hot showers, and soap can dry your skin and increase your risk for skin tears  Take tepid showers, use mild soap as directed, and gently pat your skin dry  Use lotion to keep your skin moist after you shower  Wear long sleeves, pants, and protective footwear  · Move carefully    Ask for help if you cannot lift yourself well enough to avoid dragging your skin when you move  · Keep your home safe  Cover sharp corners, keep your pathways clear, and turn on lights so you can see clearly  Ask for more information if you have questions about home safety  · Drink liquids as directed  Ask your healthcare provider how much liquid to drink each day and which liquids are best for you  Liquids will help keep your skin moist and protected from future skin tears  · Eat high-protein foods  Examples are lean meats, fish, low-fat dairy products, and beans  A dietitian may help you create high-protein meal plans to help with wound healing  Contact your healthcare provider if:   · You have redness, swelling, pus, or a bad odor coming from your wound  · Blood soaks through your bandage  · You have increased pain, even after you take pain medicine  · Your wound tears open again  Return to the emergency department if:   · You have a fever  © 2017 [address] Information is for End User's use only and may not be sold, redistributed or otherwise used for commercial purposes  All illustrations and images included in CareNotes® are the copyrighted property of A D A M [employer]  or Gene Steward  The above information is an  only  It is not intended as medical advice for individual conditions or treatments  Talk to your doctor, nurse or pharmacist before following any medical regimen to see if it is safe and effective for you

## 2019-07-12 ENCOUNTER — TELEPHONE (OUTPATIENT)
Dept: URGENT CARE | Facility: CLINIC | Age: 84
End: 2019-07-12

## 2019-07-12 NOTE — PROGRESS NOTES
[facility]        NAME: Peri Watson is a 80 y o  male  : 1930    MRN: [id number]  DATE: 2019  TIME: 5:59 PM    Assessment and Plan   Skin tear of left lower leg without complication, initial encounter [S81 [id number]]  1  Skin tear of left lower leg without complication, initial encounter  TDAP Vaccine greater than or equal to 8yo     Wounds cleansed with saline solution  No lacerations to suture  Dressed with antibiotic ointment and non adherent gauze wrap  Tetanus updated  Patient Instructions   Keep area clean and dry   Change dressing tomorrow   Wash with soap and water   You can apply antibiotic ointment 2 times a day with dressing changes  Tylenol as needed for pain   Follow up with your PCP for worsening or concerning symptoms   Tetanus updated today     Follow up with PCP in 3-5 days  Proceed to  ER if symptoms worsen  Chief Complaint     Chief Complaint   Patient presents with    Leg Injury     Hit L shin on curb getting out of a car  Unknown Tdap         History of Present Illness       Patient is an 80year old male presenting for evaluation of bilateral lower extremity injury after a fall that occurred 2 hours PTA  He was getting out of the back of a car and tripped into the curb  He has abrasions to bilateral shins  Denies hitting his head  Denies taking blood thinners  Last tetanus is unknown  Review of Systems   Review of Systems   Constitutional: Negative for activity change, chills and fever  Respiratory: Negative for cough  Musculoskeletal: Negative for arthralgias, back pain, gait problem and joint swelling  Skin: Positive for color change and wound  Neurological: Negative for dizziness and headaches           Current Medications       Current Outpatient Medications:     allopurinol (ZYLOPRIM) 100 mg tablet, Take 100 mg by mouth daily, Disp: , Rfl:     amLODIPine (NORVASC) 2 5 mg tablet, Take 1 mg by mouth daily, Disp: , Rfl:     atenolol (TENORMIN) 50 mg tablet, Take 50 mg by mouth daily, Disp: , Rfl:     atorvastatin (LIPITOR) 10 mg tablet, Take 10 mg by mouth daily, Disp: , Rfl:     cholecalciferol (VITAMIN D3) 1,000 units tablet, Take 1,000 Units by mouth daily, Disp: , Rfl:     folic acid (FOLVITE) 1 mg tablet, Take 1 mg by mouth daily, Disp: , Rfl:     Multiple Vitamins-Minerals (CENTRUM SILVER PO), Take by mouth daily, Disp: , Rfl:     Multiple Vitamins-Minerals (ICAPS AREDS 2 PO), Take by mouth daily, Disp: , Rfl:     ranitidine (ZANTAC) 150 mg tablet, Take 150 mg by mouth 2 (two) times a day, Disp: , Rfl:     thiamine (VITAMIN B1) 100 mg tablet, Take 100 mg by mouth daily, Disp: , Rfl:     doxazosin (CARDURA) 2 mg tablet, Take 2 mg by mouth daily at bedtime, Disp: , Rfl:     Mirabegron ER (MYRBETRIQ) 50 MG TB24, Take by mouth daily, Disp: , Rfl:     ranitidine (ZANTAC) 15 mg/mL syrup, Take 15 mg by mouth 2 (two) times a day, Disp: , Rfl:     Current Allergies     Allergies as of 07/10/2019 - Reviewed 07/10/2019   Allergen Reaction Noted    Iv dye [iodinated diagnostic agents]  02/26/2018            The following portions of the patient's history were reviewed and updated as appropriate: allergies, current medications, past family history, past medical history, past social history, past surgical history and problem list      Past Medical History:   Diagnosis Date    Chronic kidney disease        Past Surgical History:   Procedure Laterality Date    CATARACT EXTRACTION      CHOLECYSTECTOMY      HERNIA REPAIR         Family History   Problem Relation Age of Onset    No Known Problems Mother     No Known Problems Father          Medications have been verified          Objective   BP (!) 182/78 (Patient Position: Sitting)   Pulse 68   Temp 97 8 °F (36 6 °C) (Temporal)   Resp 16   Ht 5' 7" (1 702 m)   Wt 61 2 kg (135 lb)   BMI 21 14 kg/m²        Physical Exam     Physical Exam   Constitutional: He is oriented to person, place, and time  He appears well-developed and well-nourished  He is active  No distress  HENT:   Head: Normocephalic  Neck: Normal range of motion and full passive range of motion without pain  Cardiovascular: Normal rate  Pulmonary/Chest: Effort normal  No respiratory distress  Musculoskeletal:        Right knee: Normal         Left knee: Normal         Right ankle: Normal         Left ankle: Normal    Neurological: He is alert and oriented to person, place, and time  Skin: Skin is warm and dry

## 2019-09-25 ENCOUNTER — DOCUMENTATION (OUTPATIENT)
Dept: NEPHROLOGY | Facility: CLINIC | Age: 84
End: 2019-09-25

## 2019-09-25 LAB
EXT GLUCOSE BLD: 89
EXTERNAL ANION GAP: 11
EXTERNAL BUN: 46
EXTERNAL CALCIUM: 9
EXTERNAL CHLORIDE: 111
EXTERNAL CO2: 26
EXTERNAL CREATININE: 2.45
EXTERNAL EGFR: 22
EXTERNAL POTASSIUM: 4.9
EXTERNAL SODIUM: 143

## 2019-09-26 ENCOUNTER — TELEPHONE (OUTPATIENT)
Dept: UROLOGY | Facility: AMBULATORY SURGERY CENTER | Age: 84
End: 2019-09-26

## 2019-10-03 ENCOUNTER — OFFICE VISIT (OUTPATIENT)
Dept: NEPHROLOGY | Facility: CLINIC | Age: 84
End: 2019-10-03
Payer: MEDICARE

## 2019-10-03 VITALS
BODY MASS INDEX: 20.25 KG/M2 | HEART RATE: 70 BPM | HEIGHT: 67 IN | DIASTOLIC BLOOD PRESSURE: 60 MMHG | SYSTOLIC BLOOD PRESSURE: 140 MMHG | WEIGHT: 129 LBS

## 2019-10-03 DIAGNOSIS — N18.9 CHRONIC RENAL IMPAIRMENT, UNSPECIFIED CKD STAGE: Primary | ICD-10-CM

## 2019-10-03 PROCEDURE — 99214 OFFICE O/P EST MOD 30 MIN: CPT | Performed by: INTERNAL MEDICINE

## 2019-10-03 NOTE — PATIENT INSTRUCTIONS
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

## 2019-10-03 NOTE — LETTER
October 3, 2019     Bridget Jamil, DO  [address]  [employer]    Patient: Debo Valadez   YOB: 1930   Date of Visit: 10/3/2019       Dear Dr Angely Story:    Thank you for referring Debo Valadez to me for evaluation  Below are my notes for this consultation  If you have questions, please do not hesitate to call me  I look forward to following your patient along with you  Sincerely,        Everett Man MD        CC: No Recipients  Everett Man MD  10/3/2019  1:06 PM  Sign at close encounter  [address] 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 and follows Hematology for that and also he has had a bout of gross hematuria that is often on and he seeing Urology and that visit is pending  ASSESSMENT/PLAN:  1  Renal    Patient's chronic kidney disease and solitary kidney that was congenital   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 and his creatinine is around 2 4 which is stable and his normal baseline range without progression  In the past he had an issue with difficulty urinating and had what sounds like a potential stricture that was dilated by Urology now is urinary incontinence  Recently he has developed bouts of gross hematuria without any significant pain and is going to be evaluated by Urology  At this point renal function stable continue with blood pressure control and monitor      2  Gross hematuria    To see urology about this and I have asked him to have the doctor for the results of the workup to me     SUBJECTIVE:  Review of Systems   Constitution: Negative for chills and fever  HENT: Negative  Eyes: Negative  Cardiovascular: Negative  Negative for chest pain, dyspnea on exertion, leg swelling and orthopnea  Respiratory: Negative  Negative for cough, hemoptysis, shortness of breath and wheezing  Gastrointestinal: Negative for anorexia, diarrhea, nausea and vomiting  At times mild left-sided abdominal pain  Genitourinary: Positive for bladder incontinence and hematuria  Negative for incomplete emptying  Neurological: Negative  Psychiatric/Behavioral: Negative for altered mental status  OBJECTIVE:  Current Weight: Weight - Scale: 58 5 kg (129 lb)  [email or web address]:     Blood pressure 140/60, pulse 70, height 5' 7" (1 702 m), weight 58 5 kg (129 lb)  , Body mass index is 20 2 kg/m²  [unfilled]    Physical Exam: /60 (BP Location: Right arm, Patient Position: Sitting, Cuff Size: Standard)   Pulse 70   Ht 5' 7" (1 702 m)   Wt 58 5 kg (129 lb)   BMI 20 20 kg/m²    Physical Exam   Constitutional: He is oriented to person, place, and time  No distress  HENT:   Head: Atraumatic  Eyes: No scleral icterus  Neck: Neck supple  No JVD present  Cardiovascular: Normal rate and regular rhythm  Exam reveals no friction rub  Pulmonary/Chest: Effort normal and breath sounds normal  No respiratory distress  He has no wheezes  He has no rales  Abdominal: Soft  Bowel sounds are normal  He exhibits no distension  There is no tenderness  No CVA tenderness  Neurological: He is alert and oriented to person, place, and time  Psychiatric: He has a normal mood and affect         Medications:    Current Outpatient Medications:     allopurinol (ZYLOPRIM) 100 mg tablet, Take 100 mg by mouth daily, Disp: , Rfl:     amLODIPine (NORVASC) 2 5 mg tablet, Take 5 mg by mouth daily , Disp: , Rfl:     atenolol (TENORMIN) 50 mg tablet, Take 50 mg by mouth daily, Disp: , Rfl:   atorvastatin (LIPITOR) 10 mg tablet, Take 10 mg by mouth daily, Disp: , Rfl:     cholecalciferol (VITAMIN D3) 1,000 units tablet, Take 1,000 Units by mouth daily, Disp: , Rfl:     folic acid (FOLVITE) 1 mg tablet, Take 1 mg by mouth daily, Disp: , Rfl:     Probiotic Product (PROBIOTIC-10 PO), Take 1 tablet by mouth daily, Disp: , Rfl:     ranitidine (ZANTAC) 15 mg/mL syrup, Take 15 mg by mouth 2 (two) times a day, Disp: , Rfl:     ranitidine (ZANTAC) 150 mg tablet, Take 150 mg by mouth 2 (two) times a day, Disp: , Rfl:     thiamine (VITAMIN B1) 100 mg tablet, Take 100 mg by mouth daily, Disp: , Rfl:     doxazosin (CARDURA) 2 mg tablet, Take 2 mg by mouth daily at bedtime, Disp: , Rfl:     Mirabegron ER (MYRBETRIQ) 50 MG TB24, Take by mouth daily, Disp: , Rfl:     Multiple Vitamins-Minerals (CENTRUM SILVER PO), Take by mouth daily, Disp: , Rfl:     Multiple Vitamins-Minerals (ICAPS AREDS 2 PO), Take by mouth daily, Disp: , Rfl:     Laboratory Results:  No results found for: WBC, HGB, HCT, MCV, PLT  Lab Results   Component Value Date    SODIUM 143 09/25/2019    K 4 9 09/25/2019     09/25/2019    CO2 26 09/25/2019    BUN 46 09/25/2019    CREATININE 2 45 09/25/2019    GLUC 89 09/25/2019    CALCIUM 9 0 09/25/2019     Lab Results   Component Value Date    CALCIUM 9 0 09/25/2019    PHOS 4 0 04/02/2019     No results found for: LABPROT

## 2019-10-03 NOTE — PROGRESS NOTES
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 and follows Hematology for that and also he has had a bout of gross hematuria that is often on and he seeing Urology and that visit is pending  ASSESSMENT/PLAN:  1  Renal    Patient's chronic kidney disease and solitary kidney that was congenital   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 and his creatinine is around 2 4 which is stable and his normal baseline range without progression  In the past he had an issue with difficulty urinating and had what sounds like a potential stricture that was dilated by Urology now is urinary incontinence  Recently he has developed bouts of gross hematuria without any significant pain and is going to be evaluated by Urology  At this point renal function stable continue with blood pressure control and monitor  2  Gross hematuria    To see urology about this and I have asked him to have the doctor for the results of the workup to me  SUBJECTIVE:  Review of Systems   Constitution: Negative for chills and fever  HENT: Negative  Eyes: Negative  Cardiovascular: Negative  Negative for chest pain, dyspnea on exertion, leg swelling and orthopnea  Respiratory: Negative  Negative for cough, hemoptysis, shortness of breath and wheezing  Gastrointestinal: Negative for anorexia, diarrhea, nausea and vomiting  At times mild left-sided abdominal pain  Genitourinary: Positive for bladder incontinence and hematuria   Negative for incomplete emptying  Neurological: Negative  Psychiatric/Behavioral: Negative for altered mental status  OBJECTIVE:  Current Weight: Weight - Scale: 58 5 kg (129 lb)  [email or web address]:     Blood pressure 140/60, pulse 70, height 5' 7" (1 702 m), weight 58 5 kg (129 lb)  , Body mass index is 20 2 kg/m²  [unfilled]    Physical Exam: /60 (BP Location: Right arm, Patient Position: Sitting, Cuff Size: Standard)   Pulse 70   Ht 5' 7" (1 702 m)   Wt 58 5 kg (129 lb)   BMI 20 20 kg/m²   Physical Exam   Constitutional: He is oriented to person, place, and time  No distress  HENT:   Head: Atraumatic  Eyes: No scleral icterus  Neck: Neck supple  No JVD present  Cardiovascular: Normal rate and regular rhythm  Exam reveals no friction rub  Pulmonary/Chest: Effort normal and breath sounds normal  No respiratory distress  He has no wheezes  He has no rales  Abdominal: Soft  Bowel sounds are normal  He exhibits no distension  There is no tenderness  No CVA tenderness  Neurological: He is alert and oriented to person, place, and time  Psychiatric: He has a normal mood and affect         Medications:    Current Outpatient Medications:     allopurinol (ZYLOPRIM) 100 mg tablet, Take 100 mg by mouth daily, Disp: , Rfl:     amLODIPine (NORVASC) 2 5 mg tablet, Take 5 mg by mouth daily , Disp: , Rfl:     atenolol (TENORMIN) 50 mg tablet, Take 50 mg by mouth daily, Disp: , Rfl:     atorvastatin (LIPITOR) 10 mg tablet, Take 10 mg by mouth daily, Disp: , Rfl:     cholecalciferol (VITAMIN D3) 1,000 units tablet, Take 1,000 Units by mouth daily, Disp: , Rfl:     folic acid (FOLVITE) 1 mg tablet, Take 1 mg by mouth daily, Disp: , Rfl:     Probiotic Product (PROBIOTIC-10 PO), Take 1 tablet by mouth daily, Disp: , Rfl:     ranitidine (ZANTAC) 15 mg/mL syrup, Take 15 mg by mouth 2 (two) times a day, Disp: , Rfl:     ranitidine (ZANTAC) 150 mg tablet, Take 150 mg by mouth 2 (two) times a day, Disp: , Rfl:     thiamine (VITAMIN B1) 100 mg tablet, Take 100 mg by mouth daily, Disp: , Rfl:     doxazosin (CARDURA) 2 mg tablet, Take 2 mg by mouth daily at bedtime, Disp: , Rfl:     Mirabegron ER (MYRBETRIQ) 50 MG TB24, Take by mouth daily, Disp: , Rfl:     Multiple Vitamins-Minerals (CENTRUM SILVER PO), Take by mouth daily, Disp: , Rfl:     Multiple Vitamins-Minerals (ICAPS AREDS 2 PO), Take by mouth daily, Disp: , Rfl:     Laboratory Results:  No results found for: WBC, HGB, HCT, MCV, PLT  Lab Results   Component Value Date    SODIUM 143 09/25/2019    K 4 9 09/25/2019     09/25/2019    CO2 26 09/25/2019    BUN 46 09/25/2019    CREATININE 2 45 09/25/2019    GLUC 89 09/25/2019    CALCIUM 9 0 09/25/2019     Lab Results   Component Value Date    CALCIUM 9 0 09/25/2019    PHOS 4 0 04/02/2019     No results found for: LABPROT

## 2019-10-07 NOTE — PROGRESS NOTES
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   - We discussed workup for gross hematuria to include a CT urogram to evaluate the kidneys and ureters for any concerning masses, a cystoscopy is also recommended to evaluate the lining of the bladder   - We discussed that there are many benign causes for gross hematuria as well  - The patient reports that he recently underwent 2 CT scans of his abdomen and pelvis  He believes these were just done with oral contrast but is unsure  I have placed an order for CT urogram if the patient is able to get us his CT scans from [facility] and we are able to evaluate them, we may be able to avoid repeating tests  - He will return in the near future for cystoscopy    2  Mixed urinary incontinence  - Patient reports that this started after his urethral stricture was dilated  He is a poor historian and this may have actually been a TURP that we do have documentation of from Dr Rebel Baker  - We did discuss that in a trans urethral resection of the prostate there can be damage to the sphincter leading to incontinence  - This can also be evaluated during cystoscopy for workup for gross hematuria    3  History of prostate cancer  - Patient reports that his PSA following radiation and seed treatment for prostate cancer has been followed by a different providers  I am unable to identify any recent lab values of this    - PSA testing will be drawn in the near future, prior to cystoscopy appointment    Neema Veloz PA-C      Chief Complaint     Chief Complaint   Patient presents with    Microhematuria         History of Present Barry Leonard is a 80 y o  male patient establishing care with [facility] regarding gross hematuria  Patient additionally has complaints of urinary incontinence  Patient does have chronic kidney disease in previously saw another urologist due to difficulty urinating  It does sound like he underwent a dilation for potential stricture  He recently saw his nephrologist and reported urinary incontinence with recent bouts of gross hematuria without any significant pain  No imaging has been completed  Upon further conversation, the patient reports a history of prostate cancer treated 19 years ago with radiation and seeds  More recently he was seen by Dr Karel Dowell and there are visit notes scanned into his chart that indicated the patient underwent a TURP in January of 2018  There also notes that the patient had a bladder neck contracture following radiation therapy  Patient reports gross hematuria starting approximately 2 weeks ago  He has not had any gross hematuria for the last 4 days  He reports that his stream in general is intermittent but the urine was the color of fruit punch throughout the stream   He has no pain associated with this  Patient does have a 30 pack year smoking history quitting 40 years ago  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, occasionally he went to the planned but denies any chemical exposures  Patient was also previously treated with Myrbetriq  His wife states that this was discontinued due to cost but the patient reports that he did not think that it worked well  It sounds like the patient suffers from mixed urinary incontinence  He does have incontinence with stress maneuvers but also when he hears running water, takes drink, and with multiple other similar scenarios  Patient is not on any anticoagulation but 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        Laboratory     Lab Results   Component Value Date CREATININE 2 45 09/25/2019       No results found for: PSA    No results found for this or any previous visit (from the past 1 hour(s))  Review of Systems     Review of Systems   Constitutional: Negative for chills and fever  HENT: Negative  Eyes: Negative  Respiratory: Negative for shortness of breath  Cardiovascular: Negative for chest pain  Gastrointestinal: Negative for constipation, diarrhea, nausea and vomiting  Genitourinary: Positive for hematuria (now resolved) and urgency  Negative for difficulty urinating, dysuria, enuresis, flank pain and frequency  Musculoskeletal: Negative  Allergies     Allergies   Allergen Reactions    Iv Dye [Iodinated Diagnostic Agents]        Physical Exam     Physical Exam   Constitutional: He is oriented to person, place, and time  He appears well-developed and well-nourished  No distress  HENT:   Head: Normocephalic and atraumatic  Right Ear: External ear normal    Left Ear: External ear normal    Nose: Nose normal    Eyes: Right eye exhibits no discharge  Left eye exhibits no discharge  No scleral icterus  Cardiovascular: Normal rate and regular rhythm  Pulmonary/Chest: Effort normal    Genitourinary:   Genitourinary Comments: Negative for CVA tenderness bilaterally   Musculoskeletal:   Ambulates independently   Neurological: He is alert and oriented to person, place, and time  Skin: Skin is warm and dry  He is not diaphoretic  Psychiatric: He has a normal mood and affect  His behavior is normal  Judgment and thought content normal    Nursing note and vitals reviewed          Vital Signs     Vitals:    10/09/19 1436   BP: 142/78   BP Location: Left arm   Patient Position: Sitting   Cuff Size: Adult   Pulse: 79   Weight: 58 6 kg (129 lb 3 2 oz)   Height: 5' 7" (1 702 m)         Current Medications       Current Outpatient Medications:     allopurinol (ZYLOPRIM) 100 mg tablet, Take 100 mg by mouth daily, Disp: , Rfl:     amLODIPine (NORVASC) 2 5 mg tablet, Take 5 mg by mouth daily , Disp: , Rfl:     atenolol (TENORMIN) 50 mg tablet, Take 50 mg by mouth daily, Disp: , Rfl:     atorvastatin (LIPITOR) 10 mg tablet, Take 10 mg by mouth daily, Disp: , Rfl:     cholecalciferol (VITAMIN D3) 1,000 units tablet, Take 1,000 Units by mouth daily, Disp: , Rfl:     doxazosin (CARDURA) 2 mg tablet, Take 2 mg by mouth daily at bedtime, Disp: , Rfl:     folic acid (FOLVITE) 1 mg tablet, Take 1 mg by mouth daily, Disp: , Rfl:     metroNIDAZOLE (FLAGYL) 500 mg tablet, Take by mouth every 8 (eight) hours, Disp: , Rfl:     Mirabegron ER (MYRBETRIQ) 50 MG TB24, Take by mouth daily, Disp: , Rfl:     Multiple Vitamins-Minerals (CENTRUM SILVER PO), Take by mouth daily, Disp: , Rfl:     Multiple Vitamins-Minerals (ICAPS AREDS 2 PO), Take by mouth daily, Disp: , Rfl:     Probiotic Product (PROBIOTIC-10 PO), Take 1 tablet by mouth daily, Disp: , Rfl:     ranitidine (ZANTAC) 150 mg tablet, Take 150 mg by mouth 2 (two) times a day, Disp: , Rfl:     thiamine (VITAMIN B1) 100 mg tablet, Take 100 mg by mouth daily, Disp: , Rfl:     ranitidine (ZANTAC) 15 mg/mL syrup, Take 15 mg by mouth 2 (two) times a day, Disp: , Rfl:       Active Problems     Patient Active Problem List   Diagnosis    CRI (chronic renal insufficiency)         Past Medical History     Past Medical History:   Diagnosis Date    Chronic kidney disease          Surgical History     Past Surgical History:   Procedure Laterality Date    CATARACT EXTRACTION      CHOLECYSTECTOMY      HERNIA REPAIR           Family History     Family History   Problem Relation Age of Onset    No Known Problems Mother     No Known Problems Father          Social History     Social History       Radiology

## 2019-10-09 ENCOUNTER — OFFICE VISIT (OUTPATIENT)
Dept: UROLOGY | Facility: CLINIC | Age: 84
End: 2019-10-09
Payer: MEDICARE

## 2019-10-09 VITALS
SYSTOLIC BLOOD PRESSURE: 142 MMHG | WEIGHT: 129.2 LBS | HEART RATE: 79 BPM | HEIGHT: 67 IN | DIASTOLIC BLOOD PRESSURE: 78 MMHG | BODY MASS INDEX: 20.28 KG/M2

## 2019-10-09 DIAGNOSIS — N39.46 MIXED STRESS AND URGE URINARY INCONTINENCE: ICD-10-CM

## 2019-10-09 DIAGNOSIS — Z85.46 HISTORY OF PROSTATE CANCER: ICD-10-CM

## 2019-10-09 DIAGNOSIS — R31.9 HEMATURIA, UNSPECIFIED TYPE: Primary | ICD-10-CM

## 2019-10-09 LAB
SL AMB  POCT GLUCOSE, UA: ABNORMAL
SL AMB LEUKOCYTE ESTERASE,UA: 2
SL AMB POCT BILIRUBIN,UA: ABNORMAL
SL AMB POCT BLOOD,UA: 3
SL AMB POCT CLARITY,UA: ABNORMAL
SL AMB POCT COLOR,UA: YELLOW
SL AMB POCT KETONES,UA: ABNORMAL
SL AMB POCT NITRITE,UA: ABNORMAL
SL AMB POCT PH,UA: 6
SL AMB POCT SPECIFIC GRAVITY,UA: 1.02
SL AMB POCT URINE PROTEIN: 300
SL AMB POCT UROBILINOGEN: ABNORMAL

## 2019-10-09 PROCEDURE — 81002 URINALYSIS NONAUTO W/O SCOPE: CPT | Performed by: PHYSICIAN ASSISTANT

## 2019-10-09 PROCEDURE — 88112 CYTOPATH CELL ENHANCE TECH: CPT | Performed by: PATHOLOGY

## 2019-10-09 PROCEDURE — 99205 OFFICE O/P NEW HI 60 MIN: CPT | Performed by: PHYSICIAN ASSISTANT

## 2019-10-09 RX ORDER — METRONIDAZOLE 500 MG/1
TABLET ORAL EVERY 8 HOURS
COMMUNITY
Start: 2019-07-29 | End: 2020-05-28 | Stop reason: CLARIF

## 2019-10-15 ENCOUNTER — TELEPHONE (OUTPATIENT)
Dept: UROLOGY | Facility: AMBULATORY SURGERY CENTER | Age: 84
End: 2019-10-15

## 2019-10-15 NOTE — TELEPHONE ENCOUNTER
Radiology Cat Scan calling regarding abnormal labs for patient to have CT dye  She stated they will not be able to give dye with these results for creatine and bun  Please advise    Pt scheduled for 10/23/19

## 2019-10-15 NOTE — TELEPHONE ENCOUNTER
Patient is managed by Kevin Vicente PA-C at the [place] office  Patient is seen for gross hematuria and urge/stress incontinence  Patient was seen on 10/9/19 and was to follow up for cysto with CT prior  CT abdomen pelvis w wo contrast was ordered and is scheduled for 10/23/19  Radiology is calling reporting that contrast cannot be given due to patients creatine and bun results  Results are under external imaging under labs  BUN is 46 and creatinine is 2 45   Please advise if patient should have CT done without contrast

## 2019-10-15 NOTE — TELEPHONE ENCOUNTER
Called and spoke to radiology   Made them aware that since patient cant have contrast due to BUN and creatinine they are recommending that he have CT done without contrast  Radiology verbalized understanding and are requesting that a new order be placed for CT without contrast

## 2019-10-21 ENCOUNTER — TELEPHONE (OUTPATIENT)
Dept: UROLOGY | Facility: AMBULATORY SURGERY CENTER | Age: 84
End: 2019-10-21

## 2019-10-21 NOTE — TELEPHONE ENCOUNTER
Patient managed by [employer] is calling to ask questions regarding labs and testing  He needs clarification    Please call back and if no answer leave message

## 2019-10-21 NOTE — TELEPHONE ENCOUNTER
Patient seen on 10/9/19 by Leticia Pham PA-C in the [place] office  Patient with recent history of gross hematuria, history of prostate cancer, S/P radiation, no recent PSA  Per review of previous office note, patient scheduled for hematuria work up  Patient needs BMP prior to CT and PSA prior to his pending office cysto appointment on 11/11/19 with Dr Eugene Rivers  Spoke with patient  Patient to go to [facility] today for PSA and BMP

## 2019-10-23 ENCOUNTER — HOSPITAL ENCOUNTER (OUTPATIENT)
Dept: CT IMAGING | Facility: HOSPITAL | Age: 84
Discharge: HOME | End: 2019-10-23
Payer: MEDICARE

## 2019-10-23 DIAGNOSIS — R31.9 HEMATURIA, UNSPECIFIED TYPE: Primary | ICD-10-CM

## 2019-10-23 DIAGNOSIS — R31.9 HEMATURIA, UNSPECIFIED TYPE: ICD-10-CM

## 2019-10-23 PROCEDURE — 74176 CT ABD & PELVIS W/O CONTRAST: CPT

## 2019-11-06 NOTE — PROGRESS NOTES
Office Cystoscopy Procedure Note    Indication:    Hematuria    Informed consent   The risks, benefits, complications, treatment options, and expected outcomes were discussed with the patient  The patient concurred with the proposed plan and provided informed consent  Anesthesia  Lidocaine jelly 2%    Antibiotic prophylaxis   None    Procedure  The patient was placed in the supineposition, was prepped and draped in the usual manner using sterile technique, and 2% lidocaine jelly instilled into the urethra  A 17 F flexible cystoscope was then inserted into the urethra and the urethra and bladder carefully examined  The following findings were noted:    Findings:  Urethra:  Slight stricture within the bulbar urethra, approximately 14 German, able to be dilated easily with the cystoscope, the prostate is pale consistent with history of previous radiation, there is some fibrous character of the prostatic urethra as well  Prostate:  As above  Bladder:  Red character to the bladder mucosa with telangiectasia, consistent with radiation cystitis  Ureteral orifices:  Orthotopic  Other findings:  Retroflexed view confirms    Specimens: None                 Complications:    None; patient tolerated the procedure well           Disposition: To home            Condition: Stable    Plan: Patient's cystoscopic evaluation shows a slight urethral stricture which was dilated today, also changes consistent with radiation cystitis, no findings concerning for malignancy    Cystoscopy  Date/Time: 11/11/2019 11:41 AM  Performed by: Chacha Baker MD  Authorized by: Chacha Baker MD     Procedure details: dilation of urethral stricture    Patient tolerance: Patient tolerated the procedure well with no immediate complications    Additional Procedure Details: Indication:    Hematuria    Informed consent   The risks, benefits, complications, treatment options, and expected outcomes were discussed with the patient   The patient concurred with the proposed plan and provided informed consent  Anesthesia  Lidocaine jelly 2%    Antibiotic prophylaxis   None    Procedure  The patient was placed in the supineposition, was prepped and draped in the usual manner using sterile technique, and 2% lidocaine jelly instilled into the urethra  A 17 F flexible cystoscope was then inserted into the urethra and the urethra and bladder carefully examined    The following findings were noted:    Findings:  Urethra:  Slight stricture within the bulbar urethra, approximately 14 [place]guillan, able to be dilated easily with the cystoscope, the prostate is pale consistent with history of previous radiation, there is some fibrous character of the prostatic urethra as well  Prostate:  As above  Bladder:  Red character to the bladder mucosa with telangiectasia, consistent with radiation cystitis  Ureteral orifices:  Orthotopic  Other findings:  Retroflexed view confirms    Specimens: None                 Complications:    None; patient tolerated the procedure well           Disposition: To home            Condition: Stable    Plan: Patient's cystoscopic evaluation shows a slight urethral stricture which was dilated today, also changes consistent with radiation cystitis, no findings concerning for malignancy

## 2019-11-06 NOTE — PATIENT INSTRUCTIONS

## 2019-11-11 ENCOUNTER — TELEPHONE (OUTPATIENT)
Dept: UROLOGY | Facility: CLINIC | Age: 84
End: 2019-11-11

## 2019-11-11 ENCOUNTER — PROCEDURE VISIT (OUTPATIENT)
Dept: UROLOGY | Facility: CLINIC | Age: 84
End: 2019-11-11
Payer: MEDICARE

## 2019-11-11 VITALS
WEIGHT: 132 LBS | HEIGHT: 67 IN | DIASTOLIC BLOOD PRESSURE: 72 MMHG | HEART RATE: 66 BPM | BODY MASS INDEX: 20.72 KG/M2 | SYSTOLIC BLOOD PRESSURE: 144 MMHG

## 2019-11-11 DIAGNOSIS — Z85.46 HISTORY OF PROSTATE CANCER: ICD-10-CM

## 2019-11-11 DIAGNOSIS — R31.9 HEMATURIA, UNSPECIFIED TYPE: Primary | ICD-10-CM

## 2019-11-11 DIAGNOSIS — N39.46 MIXED STRESS AND URGE URINARY INCONTINENCE: ICD-10-CM

## 2019-11-11 LAB
SL AMB  POCT GLUCOSE, UA: NORMAL
SL AMB LEUKOCYTE ESTERASE,UA: NORMAL
SL AMB POCT BILIRUBIN,UA: NORMAL
SL AMB POCT BLOOD,UA: NORMAL
SL AMB POCT CLARITY,UA: CLEAR
SL AMB POCT COLOR,UA: YELLOW
SL AMB POCT KETONES,UA: NORMAL
SL AMB POCT NITRITE,UA: NORMAL
SL AMB POCT PH,UA: 6
SL AMB POCT SPECIFIC GRAVITY,UA: 1.02
SL AMB POCT URINE PROTEIN: 300
SL AMB POCT UROBILINOGEN: 0.2

## 2019-11-11 PROCEDURE — 52281 CYSTOSCOPY AND TREATMENT: CPT | Performed by: UROLOGY

## 2019-11-11 PROCEDURE — 99214 OFFICE O/P EST MOD 30 MIN: CPT | Performed by: UROLOGY

## 2019-11-11 PROCEDURE — 81002 URINALYSIS NONAUTO W/O SCOPE: CPT | Performed by: UROLOGY

## 2019-11-11 NOTE — ASSESSMENT & PLAN NOTE
Patient with leakage of a postvoid nature, also with urgency and frequency not being able to make it to the bathroom in time, he does have changes consistent with radiation cystitis on his cystoscopic evaluation today  I did recommend that the patient consider botulinum toxin injection into the detrusor muscle, he does not want this at this time  I do think that he may benefit from a Cunningham clamp, we will order this for the patient    He will see us in 6 months

## 2019-11-11 NOTE — LETTER
November 11, 2019     Dominick Pat DO  [address] [place] [address]    Patient: Ryann Cruz   YOB: 1930   Date of Visit: 11/11/2019       Dear Dr Rolando Palacios:    Thank you for referring Ryann Cruz to me for evaluation  Below are my notes for this consultation  If you have questions, please do not hesitate to call me  I look forward to following your patient along with you  Sincerely,        Corrinne Scriver, MD        CC: Arnita Sheriff, MD Corrinne Scriver, MD  11/11/2019 11:44 AM  Sign at close encounter       Problem List Items Addressed This Visit        Other    Mixed stress and urge urinary incontinence     Patient with leakage of a postvoid nature, also with urgency and frequency not being able to make it to the bathroom in time, he does have changes consistent with radiation cystitis on his cystoscopic evaluation today  I did recommend that the patient consider botulinum toxin injection into the detrusor muscle, he does not want this at this time  I do think that he may benefit from a Cunningham clamp, we will order this for the patient  He will see us in 6 months         Hematuria - Primary    Relevant Orders    POCT urine dip (Completed)    Cystoscopy    History of prostate cancer    Relevant Orders    POCT urine dip (Completed)          We will order PSA testing at his next visit    Assessment and plan:       Please see problem oriented charting for the assessment plan of today's urological complaints    Corrinne Scriver, MD      Chief Complaint     Chief Complaint   Patient presents with    Cystoscopy    Prostate Cancer    Gross Hematuria         History of Present Illness     Ryann Cruz is a 80 y o  gentleman that has followed with us previously for his history of prostate cancer, as well as gross hematuria and lower urinary tract symptoms mostly characterized by urgency and frequency of urination      Previous therapies include alpha blockers as well as beta 3 agonist, these did not help him, he is interested in a MD.Voice clamp  He is using up to 5-6 pads per day    He was happy to hear that his cystoscopy today showed only a small urethral stricture that was easily dilated with the cystoscope, and no findings concerning for malignancy, he does have findings that are consistent with radiation cystitis  This is consistent with his treatment for you prostate cancer 19 years ago  He has no new complaints today  The following portions of the patient's history were reviewed and updated as appropriate: allergies, current medications, past family history, past medical history, past social history, past surgical history and problem list         Detailed Urologic History     - please refer to HPI    Review of Systems     Review of Systems   Constitutional: Negative  HENT: Negative  Eyes: Negative  Respiratory: Negative  Cardiovascular: Negative  Gastrointestinal: Negative  Endocrine: Negative  Genitourinary: Positive for frequency and urgency  Musculoskeletal: Negative  Skin: Negative  Allergic/Immunologic: Negative  Neurological: Negative  Hematological: Negative  Psychiatric/Behavioral: Negative  Allergies     Allergies   Allergen Reactions    Iv Dye [Iodinated Diagnostic Agents]        Physical Exam     Physical Exam   Constitutional: He is oriented to person, place, and time  He appears well-developed and well-nourished  No distress  HENT:   Head: Normocephalic and atraumatic  Eyes: Right eye exhibits no discharge  Left eye exhibits no discharge  Neck: No tracheal deviation present  Cardiovascular: Intact distal pulses  Pulmonary/Chest: Effort normal  No stridor  No respiratory distress  Abdominal: Soft  Bowel sounds are normal  He exhibits no distension and no mass  There is no tenderness  There is no rebound and no guarding  No hernia     Genitourinary:   Genitourinary Comments: Uncircumcised phallus, no phimosis, foreskin was reduced at the end of today's procedure   Musculoskeletal: Normal range of motion  He exhibits no edema, tenderness or deformity  Neurological: He is alert and oriented to person, place, and time  No cranial nerve deficit  Coordination normal    Skin: Skin is warm and dry  No rash noted  He is not diaphoretic  No erythema  No pallor  Psychiatric: He has a normal mood and affect  His behavior is normal  Judgment and thought content normal    Nursing note and vitals reviewed            Vital Signs  Vitals:    11/11/19 1102   BP: 144/72   BP Location: Left arm   Patient Position: Sitting   Cuff Size: Standard   Pulse: 66   Weight: 59 9 kg (132 lb)   Height: 5' 7" (1 702 m)         Current Medications       Current Outpatient Medications:     allopurinol (ZYLOPRIM) 100 mg tablet, Take 100 mg by mouth daily, Disp: , Rfl:     amLODIPine (NORVASC) 2 5 mg tablet, Take 5 mg by mouth daily , Disp: , Rfl:     atenolol (TENORMIN) 50 mg tablet, Take 50 mg by mouth daily, Disp: , Rfl:     atorvastatin (LIPITOR) 10 mg tablet, Take 10 mg by mouth daily, Disp: , Rfl:     cholecalciferol (VITAMIN D3) 1,000 units tablet, Take 1,000 Units by mouth daily, Disp: , Rfl:     doxazosin (CARDURA) 2 mg tablet, Take 2 mg by mouth daily at bedtime, Disp: , Rfl:     folic acid (FOLVITE) 1 mg tablet, Take 1 mg by mouth daily, Disp: , Rfl:     metroNIDAZOLE (FLAGYL) 500 mg tablet, Take by mouth every 8 (eight) hours, Disp: , Rfl:     Multiple Vitamins-Minerals (CENTRUM SILVER PO), Take by mouth daily, Disp: , Rfl:     Multiple Vitamins-Minerals (ICAPS AREDS 2 PO), Take by mouth daily, Disp: , Rfl:     Probiotic Product (PROBIOTIC-10 PO), Take 1 tablet by mouth daily, Disp: , Rfl:     ranitidine (ZANTAC) 15 mg/mL syrup, Take 15 mg by mouth 2 (two) times a day, Disp: , Rfl:     ranitidine (ZANTAC) 150 mg tablet, Take 150 mg by mouth 2 (two) times a day, Disp: , Rfl:     thiamine (VITAMIN B1) 100 mg tablet, Take 100 mg by mouth daily, Disp: , Rfl:       Active Problems     Patient Active Problem List   Diagnosis    CRI (chronic renal insufficiency)    Mixed stress and urge urinary incontinence    Hematuria    History of prostate cancer         Past Medical History     Past Medical History:   Diagnosis Date    Chronic kidney disease          Surgical History     Past Surgical History:   Procedure Laterality Date    CATARACT EXTRACTION      CHOLECYSTECTOMY      HERNIA REPAIR           Family History     Family History   Problem Relation Age of Onset    No Known Problems Mother     No Known Problems Father          Social History     Social History     Social History     Tobacco Use   Smoking Status Former Smoker    Last attempt to quit: 1978    Years since quittin 7   Smokeless Tobacco Never Used         Pertinent Lab Values     Lab Results   Component Value Date    CREATININE 2 45 2019       No results found for: PSA          Pertinent Imaging   CT scan shows some right renal cyst, and absent left kidney, no findings concerning for malignancy    Peyton Zhao MD  2019 11:41 AM  Sign at close encounter  Office Cystoscopy Procedure Note    Indication:    Hematuria    Informed consent   The risks, benefits, complications, treatment options, and expected outcomes were discussed with the patient  The patient concurred with the proposed plan and provided informed consent  Anesthesia  Lidocaine jelly 2%    Antibiotic prophylaxis   None    Procedure  The patient was placed in the supineposition, was prepped and draped in the usual manner using sterile technique, and 2% lidocaine jelly instilled into the urethra  A 17 F flexible cystoscope was then inserted into the urethra and the urethra and bladder carefully examined    The following findings were noted:    Findings:  Urethra:  Slight stricture within the bulbar urethra, approximately 14 Italian, able to be dilated easily with the cystoscope, the prostate is pale consistent with history of previous radiation, there is some fibrous character of the prostatic urethra as well  Prostate:  As above  Bladder:  Red character to the bladder mucosa with telangiectasia, consistent with radiation cystitis  Ureteral orifices:  Orthotopic  Other findings:  Retroflexed view confirms    Specimens: None                 Complications:    None; patient tolerated the procedure well           Disposition: To home            Condition: Stable    Plan: Patient's cystoscopic evaluation shows a slight urethral stricture which was dilated today, also changes consistent with radiation cystitis, no findings concerning for malignancy    Cystoscopy  Date/Time: 11/11/2019 11:41 AM  Performed by: Deshaun Rios MD  Authorized by: Deshaun Rios MD     Procedure details: dilation of urethral stricture    Patient tolerance: Patient tolerated the procedure well with no immediate complications    Additional Procedure Details: Indication:    Hematuria    Informed consent   The risks, benefits, complications, treatment options, and expected outcomes were discussed with the patient  The patient concurred with the proposed plan and provided informed consent  Anesthesia  Lidocaine jelly 2%    Antibiotic prophylaxis   None    Procedure  The patient was placed in the supineposition, was prepped and draped in the usual manner using sterile technique, and 2% lidocaine jelly instilled into the urethra  A 17 F flexible cystoscope was then inserted into the urethra and the urethra and bladder carefully examined    The following findings were noted:    Findings:  Urethra:  Slight stricture within the bulbar urethra, approximately 14 [place]ese, able to be dilated easily with the cystoscope, the prostate is pale consistent with history of previous radiation, there is some fibrous character of the prostatic urethra as well  Prostate:  As above  Bladder:  Red character to the bladder mucosa with telangiectasia, consistent with radiation cystitis  Ureteral orifices:  Orthotopic  Other findings:  Retroflexed view confirms    Specimens: None                 Complications:    None; patient tolerated the procedure well           Disposition: To home            Condition: Stable    Plan: Patient's cystoscopic evaluation shows a slight urethral stricture which was dilated today, also changes consistent with radiation cystitis, no findings concerning for malignancy

## 2019-11-11 NOTE — PROGRESS NOTES
Problem List Items Addressed This Visit        Other    Mixed stress and urge urinary incontinence     Patient with leakage of a postvoid nature, also with urgency and frequency not being able to make it to the bathroom in time, he does have changes consistent with radiation cystitis on his cystoscopic evaluation today  I did recommend that the patient consider botulinum toxin injection into the detrusor muscle, he does not want this at this time  I do think that he may benefit from a Cunningham clamp, we will order this for the patient  He will see us in 6 months         Hematuria - Primary    Relevant Orders    POCT urine dip (Completed)    Cystoscopy    History of prostate cancer    Relevant Orders    POCT urine dip (Completed)          We will order PSA testing at his next visit    Assessment and plan:       Please see problem oriented charting for the assessment plan of today's urological complaints    Valentina Franz MD      Chief Complaint     Chief Complaint   Patient presents with    Cystoscopy    Prostate Cancer    Gross Hematuria         History of Present Illness     Alli Skelton is a 80 y o  gentleman that has followed with us previously for his history of prostate cancer, as well as gross hematuria and lower urinary tract symptoms mostly characterized by urgency and frequency of urination  Previous therapies include alpha blockers as well as beta 3 agonist, these did not help him, he is interested in a [place] clamp  He is using up to 5-6 pads per day    He was happy to hear that his cystoscopy today showed only a small urethral stricture that was easily dilated with the cystoscope, and no findings concerning for malignancy, he does have findings that are consistent with radiation cystitis  This is consistent with his treatment for you prostate cancer 19 years ago  He has no new complaints today      The following portions of the patient's history were reviewed and updated as appropriate: allergies, current medications, past family history, past medical history, past social history, past surgical history and problem list         Detailed Urologic History     - please refer to HPI    Review of Systems     Review of Systems   Constitutional: Negative  HENT: Negative  Eyes: Negative  Respiratory: Negative  Cardiovascular: Negative  Gastrointestinal: Negative  Endocrine: Negative  Genitourinary: Positive for frequency and urgency  Musculoskeletal: Negative  Skin: Negative  Allergic/Immunologic: Negative  Neurological: Negative  Hematological: Negative  Psychiatric/Behavioral: Negative  Allergies     Allergies   Allergen Reactions    Iv Dye [Iodinated Diagnostic Agents]        Physical Exam     Physical Exam   Constitutional: He is oriented to person, place, and time  He appears well-developed and well-nourished  No distress  HENT:   Head: Normocephalic and atraumatic  Eyes: Right eye exhibits no discharge  Left eye exhibits no discharge  Neck: No tracheal deviation present  Cardiovascular: Intact distal pulses  Pulmonary/Chest: Effort normal  No stridor  No respiratory distress  Abdominal: Soft  Bowel sounds are normal  He exhibits no distension and no mass  There is no tenderness  There is no rebound and no guarding  No hernia  Genitourinary:   Genitourinary Comments: Uncircumcised phallus, no phimosis, foreskin was reduced at the end of today's procedure   Musculoskeletal: Normal range of motion  He exhibits no edema, tenderness or deformity  Neurological: He is alert and oriented to person, place, and time  No cranial nerve deficit  Coordination normal    Skin: Skin is warm and dry  No rash noted  He is not diaphoretic  No erythema  No pallor  Psychiatric: He has a normal mood and affect  His behavior is normal  Judgment and thought content normal    Nursing note and vitals reviewed            Vital Signs  Vitals: 11/11/19 1102   BP: 144/72   BP Location: Left arm   Patient Position: Sitting   Cuff Size: Standard   Pulse: 66   Weight: 59 9 kg (132 lb)   Height: 5' 7" (1 702 m)         Current Medications       Current Outpatient Medications:     allopurinol (ZYLOPRIM) 100 mg tablet, Take 100 mg by mouth daily, Disp: , Rfl:     amLODIPine (NORVASC) 2 5 mg tablet, Take 5 mg by mouth daily , Disp: , Rfl:     atenolol (TENORMIN) 50 mg tablet, Take 50 mg by mouth daily, Disp: , Rfl:     atorvastatin (LIPITOR) 10 mg tablet, Take 10 mg by mouth daily, Disp: , Rfl:     cholecalciferol (VITAMIN D3) 1,000 units tablet, Take 1,000 Units by mouth daily, Disp: , Rfl:     doxazosin (CARDURA) 2 mg tablet, Take 2 mg by mouth daily at bedtime, Disp: , Rfl:     folic acid (FOLVITE) 1 mg tablet, Take 1 mg by mouth daily, Disp: , Rfl:     metroNIDAZOLE (FLAGYL) 500 mg tablet, Take by mouth every 8 (eight) hours, Disp: , Rfl:     Multiple Vitamins-Minerals (CENTRUM SILVER PO), Take by mouth daily, Disp: , Rfl:     Multiple Vitamins-Minerals (ICAPS AREDS 2 PO), Take by mouth daily, Disp: , Rfl:     Probiotic Product (PROBIOTIC-10 PO), Take 1 tablet by mouth daily, Disp: , Rfl:     ranitidine (ZANTAC) 15 mg/mL syrup, Take 15 mg by mouth 2 (two) times a day, Disp: , Rfl:     ranitidine (ZANTAC) 150 mg tablet, Take 150 mg by mouth 2 (two) times a day, Disp: , Rfl:     thiamine (VITAMIN B1) 100 mg tablet, Take 100 mg by mouth daily, Disp: , Rfl:       Active Problems     Patient Active Problem List   Diagnosis    CRI (chronic renal insufficiency)    Mixed stress and urge urinary incontinence    Hematuria    History of prostate cancer         Past Medical History     Past Medical History:   Diagnosis Date    Chronic kidney disease          Surgical History     Past Surgical History:   Procedure Laterality Date    CATARACT EXTRACTION      CHOLECYSTECTOMY      HERNIA REPAIR           Family History     Family History   Problem Relation Age of Onset    No Known Problems Mother     No Known Problems Father          Social History     Social History     Social History     Tobacco Use   Smoking Status Former Smoker    Last attempt to quit: 1978    Years since quittin 7   Smokeless Tobacco Never Used         Pertinent Lab Values     Lab Results   Component Value Date    CREATININE 2 45 2019       No results found for: PSA          Pertinent Imaging   CT scan shows some right renal cyst, and absent left kidney, no findings concerning for malignancy

## 2019-11-12 NOTE — TELEPHONE ENCOUNTER
Called and spoke with patient's wife Kun Turner  Informed her we have a cunningham clamp here at the [facility] office  I will leave it for them to  at their convenience

## 2019-11-19 ENCOUNTER — TELEPHONE (OUTPATIENT)
Dept: NEPHROLOGY | Facility: CLINIC | Age: 84
End: 2019-11-19

## 2019-11-19 NOTE — TELEPHONE ENCOUNTER
I left a message for patient to call back to schedule January follow up with Dr Carlo Gonzales in our [facility]

## 2020-02-03 ENCOUNTER — DOCUMENTATION (OUTPATIENT)
Dept: NEPHROLOGY | Facility: CLINIC | Age: 85
End: 2020-02-03

## 2020-02-03 LAB
EXT GLUCOSE BLD: 85
EXTERNAL ANION GAP: 14
EXTERNAL BUN: 52
EXTERNAL CALCIUM: 9
EXTERNAL CHLORIDE: 113
EXTERNAL CO2: 22
EXTERNAL CREATININE: 2.81
EXTERNAL EGFR: 19
EXTERNAL POTASSIUM: 5
EXTERNAL SODIUM: 144

## 2020-02-04 ENCOUNTER — OFFICE VISIT (OUTPATIENT)
Dept: NEPHROLOGY | Facility: CLINIC | Age: 85
End: 2020-02-04
Payer: MEDICARE

## 2020-02-04 VITALS
WEIGHT: 128 LBS | SYSTOLIC BLOOD PRESSURE: 148 MMHG | BODY MASS INDEX: 20.09 KG/M2 | HEIGHT: 67 IN | HEART RATE: 70 BPM | DIASTOLIC BLOOD PRESSURE: 64 MMHG

## 2020-02-04 DIAGNOSIS — N18.9 CHRONIC RENAL IMPAIRMENT, UNSPECIFIED CKD STAGE: Primary | ICD-10-CM

## 2020-02-04 PROCEDURE — 99214 OFFICE O/P EST MOD 30 MIN: CPT | Performed by: INTERNAL MEDICINE

## 2020-02-04 NOTE — LETTER
February 4, 2020     Rober Farmer DO  [address]  [employer]    Patient: Rush Torres   YOB: 1930   Date of Visit: 2/4/2020       Dear Dr Jerson Isbell:    Thank you for referring Rush Torres to me for evaluation  Below are my notes for this consultation  If you have questions, please do not hesitate to call me  I look forward to following your patient along with you  Sincerely,        Rose Cole MD        CC: No Recipients  Rose Cole MD  2/4/2020  3:45 PM  Sign at close encounter  [facility]    Rush Torres 80 y o  male MRN: [id number]  Unit/Bed#:  Encounter: [id number]  Reason for Consult:  Chronic kidney disease    Patient is here for routine 4 month follow-up since I have seen him he has been doing well he did get investigated by Urology and had a cystoscope and says he is urinating better without and has had no more gross hematuria  He is also eating okay and has had no hospitalizations  ASSESSMENT/PLAN:  1  Renal    Patient's chronic kidney disease and 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 either from hyper filtration injury or other things that may have happened earlier in his life  He does not have signs of aggressive kidney disease creatinine stable around 2 7 and he tends to fluctuate at times likely due to changes in volume status but overall things are relatively stable without significant progression  Blood pressure is borderline for now he will continue with his current medications and we will monitor  At this point no changes avoid NSAIDs and monitor with blood pressure control  SUBJECTIVE:  Review of Systems   Constitution: Negative for chills, decreased appetite, fever and malaise/fatigue  HENT: Negative  Eyes: Negative  Cardiovascular: Negative  Negative for chest pain, dyspnea on exertion, leg swelling, orthopnea and palpitations  Respiratory: Negative    Negative for cough, shortness of breath, sputum production and wheezing  Gastrointestinal: Negative  Negative for bloating, abdominal pain, diarrhea, nausea and vomiting  Genitourinary: Positive for bladder incontinence  Negative for dysuria, hematuria and incomplete emptying  Feels he has a strong urinary stream after cystoscopic evaluation  Neurological: Negative  Psychiatric/Behavioral: Negative  OBJECTIVE:  Current Weight: Weight - Scale: 58 1 kg (128 lb)  [email or web address]:     Blood pressure 148/64, pulse 70, height 5' 7" (1 702 m), weight 58 1 kg (128 lb)  , Body mass index is 20 05 kg/m²  [unfilled]    Physical Exam: /64 (BP Location: Right arm, Patient Position: Sitting, Cuff Size: Standard)   Pulse 70   Ht 5' 7" (1 702 m)   Wt 58 1 kg (128 lb)   BMI 20 05 kg/m²    Physical Exam   Constitutional: He is oriented to person, place, and time  No distress  HENT:   Head: Atraumatic  Mouth/Throat: Oropharynx is clear and moist    Eyes: Conjunctivae and EOM are normal  No scleral icterus  Neck: Neck supple  No JVD present  Cardiovascular: Normal rate and regular rhythm  Exam reveals no gallop and no friction rub  No pretibial edema  Pulmonary/Chest: Effort normal and breath sounds normal  No respiratory distress  He has no wheezes  He has no rales  Abdominal: Soft  Bowel sounds are normal  He exhibits no distension  There is no tenderness  There is no rebound  Neurological: He is alert and oriented to person, place, and time  Skin: He is not diaphoretic  Psychiatric: He has a normal mood and affect         Medications:    Current Outpatient Medications:     allopurinol (ZYLOPRIM) 100 mg tablet, Take 100 mg by mouth daily, Disp: , Rfl:     amLODIPine (NORVASC) 2 5 mg tablet, Take 5 mg by mouth daily , Disp: , Rfl:     atenolol (TENORMIN) 50 mg tablet, Take 50 mg by mouth daily, Disp: , Rfl:     atorvastatin (LIPITOR) 10 mg tablet, Take 10 mg by mouth daily, Disp: , Rfl:     cholecalciferol (VITAMIN D3) 1,000 units tablet, Take 1,000 Units by mouth daily, Disp: , Rfl:     folic acid (FOLVITE) 1 mg tablet, Take 800 mcg by mouth daily , Disp: , Rfl:     Multiple Vitamins-Minerals (CENTRUM SILVER PO), Take by mouth daily, Disp: , Rfl:     Probiotic Product (PROBIOTIC-10 PO), Take 1 tablet by mouth daily, Disp: , Rfl:     thiamine (VITAMIN B1) 100 mg tablet, Take 100 mg by mouth daily, Disp: , Rfl:     doxazosin (CARDURA) 2 mg tablet, Take 2 mg by mouth daily at bedtime, Disp: , Rfl:     metroNIDAZOLE (FLAGYL) 500 mg tablet, Take by mouth every 8 (eight) hours, Disp: , Rfl:     Multiple Vitamins-Minerals (ICAPS AREDS 2 PO), Take by mouth daily, Disp: , Rfl:     ranitidine (ZANTAC) 15 mg/mL syrup, Take 15 mg by mouth 2 (two) times a day, Disp: , Rfl:     Laboratory Results:  No results found for: WBC, HGB, HCT, MCV, PLT  Lab Results   Component Value Date    SODIUM 144 02/01/2020    K 5 0 02/01/2020     02/01/2020    CO2 22 02/01/2020    BUN 52 02/01/2020    CREATININE 2 81 02/01/2020    GLUC 85 02/01/2020    CALCIUM 9 0 02/01/2020     Lab Results   Component Value Date    CALCIUM 9 0 02/01/2020    PHOS 4 0 04/02/2019     No results found for: LABPROT

## 2020-02-04 NOTE — PROGRESS NOTES
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 either from hyper filtration injury or other things that may have happened earlier in his life  He does not have signs of aggressive kidney disease creatinine stable around 2 7 and he tends to fluctuate at times likely due to changes in volume status but overall things are relatively stable without significant progression  Blood pressure is borderline for now he will continue with his current medications and we will monitor  At this point no changes avoid NSAIDs and monitor with blood pressure control  SUBJECTIVE:  Review of Systems   Constitution: Negative for chills, decreased appetite, fever and malaise/fatigue  HENT: Negative  Eyes: Negative  Cardiovascular: Negative  Negative for chest pain, dyspnea on exertion, leg swelling, orthopnea and palpitations  Respiratory: Negative  Negative for cough, shortness of breath, sputum production and wheezing  Gastrointestinal: Negative  Negative for bloating, abdominal pain, diarrhea, nausea and vomiting  Genitourinary: Positive for bladder incontinence  Negative for dysuria, hematuria and incomplete emptying  Feels he has a strong urinary stream after cystoscopic evaluation  Neurological: Negative  Psychiatric/Behavioral: Negative          OBJECTIVE:  Current Weight: Weight - Scale: 58 1 kg (128 lb)  [email or web address]:     Blood pressure 148/64, pulse 70, height 5' 7" (1 702 m), weight 58 1 kg (128 lb)  , Body mass index is 20 05 kg/m²  [unfilled]    Physical Exam: /64 (BP Location: Right arm, Patient Position: Sitting, Cuff Size: Standard)   Pulse 70   Ht 5' 7" (1 702 m)   Wt 58 1 kg (128 lb)   BMI 20 05 kg/m²   Physical Exam   Constitutional: He is oriented to person, place, and time  No distress  HENT:   Head: Atraumatic  Mouth/Throat: Oropharynx is clear and moist    Eyes: Conjunctivae and EOM are normal  No scleral icterus  Neck: Neck supple  No JVD present  Cardiovascular: Normal rate and regular rhythm  Exam reveals no gallop and no friction rub  No pretibial edema  Pulmonary/Chest: Effort normal and breath sounds normal  No respiratory distress  He has no wheezes  He has no rales  Abdominal: Soft  Bowel sounds are normal  He exhibits no distension  There is no tenderness  There is no rebound  Neurological: He is alert and oriented to person, place, and time  Skin: He is not diaphoretic  Psychiatric: He has a normal mood and affect         Medications:    Current Outpatient Medications:     allopurinol (ZYLOPRIM) 100 mg tablet, Take 100 mg by mouth daily, Disp: , Rfl:     amLODIPine (NORVASC) 2 5 mg tablet, Take 5 mg by mouth daily , Disp: , Rfl:     atenolol (TENORMIN) 50 mg tablet, Take 50 mg by mouth daily, Disp: , Rfl:     atorvastatin (LIPITOR) 10 mg tablet, Take 10 mg by mouth daily, Disp: , Rfl:     cholecalciferol (VITAMIN D3) 1,000 units tablet, Take 1,000 Units by mouth daily, Disp: , Rfl:     folic acid (FOLVITE) 1 mg tablet, Take 800 mcg by mouth daily , Disp: , Rfl:     Multiple Vitamins-Minerals (CENTRUM SILVER PO), Take by mouth daily, Disp: , Rfl:     Probiotic Product (PROBIOTIC-10 PO), Take 1 tablet by mouth daily, Disp: , Rfl:     thiamine (VITAMIN B1) 100 mg tablet, Take 100 mg by mouth daily, Disp: , Rfl:     doxazosin (CARDURA) 2 mg tablet, Take 2 mg by mouth daily at bedtime, Disp: , Rfl:     metroNIDAZOLE (FLAGYL) 500 mg tablet, Take by mouth every 8 (eight) hours, Disp: , Rfl:     Multiple Vitamins-Minerals (ICAPS AREDS 2 PO), Take by mouth daily, Disp: , Rfl:     ranitidine (ZANTAC) 15 mg/mL syrup, Take 15 mg by mouth 2 (two) times a day, Disp: , Rfl:     Laboratory Results:  No results found for: WBC, HGB, HCT, MCV, PLT  Lab Results   Component Value Date    SODIUM 144 02/01/2020    K 5 0 02/01/2020     02/01/2020    CO2 22 02/01/2020    BUN 52 02/01/2020    CREATININE 2 81 02/01/2020    GLUC 85 02/01/2020    CALCIUM 9 0 02/01/2020     Lab Results   Component Value Date    CALCIUM 9 0 02/01/2020    PHOS 4 0 04/02/2019     No results found for: LABPROT

## 2020-02-04 NOTE — PATIENT INSTRUCTIONS
You are here for follow-up sounds like your health has been doing good you have worked out a lot of the issues we discussed last visit  Your not urinating blood anymore and have been evaluated by Urology  Your creatinine level which is the blood test for the kidney function was 2 7 which is stable in her normal baseline range  You likely have some nephrosclerosis or aging related to having a solitary kidney for your whole life but things have remained stable  Continue current medications and will continue to monitor with no changes

## 2020-05-11 ENCOUNTER — TELEMEDICINE (OUTPATIENT)
Dept: UROLOGY | Facility: CLINIC | Age: 85
End: 2020-05-11
Payer: MEDICARE

## 2020-05-11 DIAGNOSIS — Z85.46 HISTORY OF PROSTATE CANCER: Primary | ICD-10-CM

## 2020-05-11 DIAGNOSIS — N39.46 MIXED STRESS AND URGE URINARY INCONTINENCE: ICD-10-CM

## 2020-05-11 PROCEDURE — 99442 PR PHYS/QHP TELEPHONE EVALUATION 11-20 MIN: CPT | Performed by: PHYSICIAN ASSISTANT

## 2020-05-21 ENCOUNTER — TELEPHONE (OUTPATIENT)
Dept: NEPHROLOGY | Facility: CLINIC | Age: 85
End: 2020-05-21

## 2020-05-28 ENCOUNTER — TELEMEDICINE (OUTPATIENT)
Dept: NEPHROLOGY | Facility: CLINIC | Age: 85
End: 2020-05-28
Payer: MEDICARE

## 2020-05-28 VITALS — DIASTOLIC BLOOD PRESSURE: 70 MMHG | HEART RATE: 70 BPM | SYSTOLIC BLOOD PRESSURE: 120 MMHG

## 2020-05-28 DIAGNOSIS — N18.9 CHRONIC RENAL IMPAIRMENT, UNSPECIFIED CKD STAGE: ICD-10-CM

## 2020-05-28 DIAGNOSIS — IMO0002 SOLITARY KIDNEY: ICD-10-CM

## 2020-05-28 DIAGNOSIS — I12.9 NEPHROSCLEROSIS ARTERIOLAR, STAGE 1-4 OR UNSPECIFIED CHRONIC KIDNEY DISEASE: ICD-10-CM

## 2020-05-28 DIAGNOSIS — K21.9 CHRONIC GERD: Primary | ICD-10-CM

## 2020-05-28 PROCEDURE — 99443 PR PHYS/QHP TELEPHONE EVALUATION 21-30 MIN: CPT | Performed by: INTERNAL MEDICINE

## 2020-05-28 RX ORDER — FAMOTIDINE 20 MG/1
20 TABLET, FILM COATED ORAL 2 TIMES DAILY
Qty: 30 TABLET | Refills: 5
Start: 2020-05-28

## 2020-07-10 ENCOUNTER — APPOINTMENT (OUTPATIENT)
Dept: LAB | Facility: CLINIC | Age: 85
End: 2020-07-10
Payer: MEDICARE

## 2020-07-10 ENCOUNTER — TRANSCRIBE ORDERS (OUTPATIENT)
Dept: LAB | Facility: CLINIC | Age: 85
End: 2020-07-10

## 2020-07-10 DIAGNOSIS — E78.2 MIXED HYPERLIPIDEMIA: Primary | ICD-10-CM

## 2020-07-10 DIAGNOSIS — I10 ESSENTIAL HYPERTENSION, MALIGNANT: ICD-10-CM

## 2020-07-10 DIAGNOSIS — N18.9 CHRONIC RENAL IMPAIRMENT, UNSPECIFIED CKD STAGE: ICD-10-CM

## 2020-07-10 DIAGNOSIS — R31.9 HEMATURIA, UNSPECIFIED TYPE: ICD-10-CM

## 2020-07-10 LAB
ALBUMIN SERPL BCP-MCNC: 3.8 G/DL (ref 3.5–5)
ALP SERPL-CCNC: 94 U/L (ref 46–116)
ALT SERPL W P-5'-P-CCNC: 17 U/L (ref 12–78)
ANION GAP SERPL CALCULATED.3IONS-SCNC: 6 MMOL/L (ref 4–13)
AST SERPL W P-5'-P-CCNC: 20 U/L (ref 5–45)
BILIRUB SERPL-MCNC: 0.4 MG/DL (ref 0.2–1)
BUN SERPL-MCNC: 42 MG/DL (ref 5–25)
CALCIUM SERPL-MCNC: 8.8 MG/DL (ref 8.3–10.1)
CHLORIDE SERPL-SCNC: 113 MMOL/L (ref 100–108)
CHOLEST SERPL-MCNC: 126 MG/DL (ref 50–200)
CO2 SERPL-SCNC: 24 MMOL/L (ref 21–32)
CREAT SERPL-MCNC: 2.87 MG/DL (ref 0.6–1.3)
GFR SERPL CREATININE-BSD FRML MDRD: 19 ML/MIN/1.73SQ M
GLUCOSE P FAST SERPL-MCNC: 86 MG/DL (ref 65–99)
HDLC SERPL-MCNC: 57 MG/DL
LDLC SERPL CALC-MCNC: 54 MG/DL (ref 0–100)
NONHDLC SERPL-MCNC: 69 MG/DL
POTASSIUM SERPL-SCNC: 4.6 MMOL/L (ref 3.5–5.3)
PROT SERPL-MCNC: 7.4 G/DL (ref 6.4–8.2)
SODIUM SERPL-SCNC: 143 MMOL/L (ref 136–145)
TRIGL SERPL-MCNC: 75 MG/DL

## 2020-07-10 PROCEDURE — 80061 LIPID PANEL: CPT

## 2020-07-10 PROCEDURE — 36415 COLL VENOUS BLD VENIPUNCTURE: CPT

## 2020-07-10 PROCEDURE — 80053 COMPREHEN METABOLIC PANEL: CPT

## 2020-07-13 ENCOUNTER — TELEPHONE (OUTPATIENT)
Dept: NEPHROLOGY | Facility: CLINIC | Age: 85
End: 2020-07-13

## 2020-07-13 NOTE — TELEPHONE ENCOUNTER
Message left on patient's voicemail that kidney function stable       ----- Message from Bala Chavira MD sent at 7/12/2020  7:08 PM EDT -----  Please call and let patient know creatinine stable   Thanks   ----- Message -----  From: Lab, Background User  Sent: 7/10/2020   8:39 PM EDT  To: Bala Chavira MD

## 2020-08-10 ENCOUNTER — TELEPHONE (OUTPATIENT)
Dept: PULMONOLOGY | Facility: CLINIC | Age: 85
End: 2020-08-10

## 2020-08-11 ENCOUNTER — OFFICE VISIT (OUTPATIENT)
Dept: PULMONOLOGY | Facility: CLINIC | Age: 85
End: 2020-08-11
Payer: MEDICARE

## 2020-08-11 VITALS
WEIGHT: 124.38 LBS | TEMPERATURE: 96.9 F | OXYGEN SATURATION: 97 % | BODY MASS INDEX: 19.52 KG/M2 | SYSTOLIC BLOOD PRESSURE: 120 MMHG | HEIGHT: 67 IN | DIASTOLIC BLOOD PRESSURE: 74 MMHG | HEART RATE: 65 BPM

## 2020-08-11 DIAGNOSIS — G47.33 OSA (OBSTRUCTIVE SLEEP APNEA): Primary | ICD-10-CM

## 2020-08-11 DIAGNOSIS — J92.0 ASBESTOS-INDUCED PLEURAL PLAQUE: ICD-10-CM

## 2020-08-11 DIAGNOSIS — I10 ESSENTIAL HYPERTENSION: ICD-10-CM

## 2020-08-11 DIAGNOSIS — J43.9 PULMONARY EMPHYSEMA, UNSPECIFIED EMPHYSEMA TYPE (HCC): ICD-10-CM

## 2020-08-11 PROBLEM — I27.20 PULMONARY HYPERTENSION (HCC): Status: ACTIVE | Noted: 2020-08-11

## 2020-08-11 PROCEDURE — 99214 OFFICE O/P EST MOD 30 MIN: CPT | Performed by: INTERNAL MEDICINE

## 2020-08-11 NOTE — ASSESSMENT & PLAN NOTE
Aristeo Villasenor was diagnosed with obstructive sleep apnea of moderate to severe degree on a diagnostic overnight polysomnogram done at [facility], [address] [place] [address] in November 2005 and was subsequently started on CPAP with 7 cms of water pressure  He underwent a CPAP retitration study in November 2008 at [facility] and it was found that he required CPAP 12 cms using a ResMed Mirage [place] nasal pillows size medium and warm humidification to correct his sleep apnea  His repeat sleep study done showed that he needs CPAP pressure 11 cms using a ResMed FX nasal mask size standard  He is feeling better with the CPAP therapy  His daytime sleepiness is much better now   I have asked him to continue as before  He is currently doing well on CPAP therapy and is compliant by history  he is getting clinical benefit from CPAP therapy  He is with the [address]  I have asked him to continue as before

## 2020-08-11 NOTE — ASSESSMENT & PLAN NOTE
He was a smoker in the past and his previous CT scan of the chest from 07 20 2015 showed mild apical emphysematous changes and his PFT showed decreased diffusion defect  Currently he is asymptomatic and is not on any inhaler or oxygen

## 2020-08-11 NOTE — PROGRESS NOTES
Assessment:    1  PRISCILLA (obstructive sleep apnea)     2  Asbestos-induced pleural plaque  CT chest without contrast   3  Pulmonary emphysema, unspecified emphysema type ([address] )     4  Essential hypertension       Chest x-ray:  Pleural pleural  CT of chest performed previously [id number] without contrast revealed pleural plaques and emphysema  Pulmonary function tests:  Diffusion defect     Plan:     Continue CPAP therapy for obstructive sleep apnea at the current settings  Compliant by history  Compliance Records are not currently available  Getting clinical benefit from CPAP therapy  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 needs repeat CT scan  Has emphysema related to previous smoking  Currently not on any inhaler or oxygen  Observe for now  Hypertension blood pressure stable    Subjective:     Patient ID: Dyllan Rock is a 80 y o  male  Chief Complaint:  Mr Michi Clifford has obstructive sleep apnea and has been on CPAP therapy  He states that he uses the CPAP regularly and is comfortable with the mask and pressure  He has no significant daytime sleepiness or interrupted sleep or morning headache or daytime tiredness  He feels that the CPAP therapy is helping him and is very motivated to continue on CPAP therapy  The [address] is his DME and is getting supplies as needed  His latest CPAP compliance records are not currently available  He has a computer chip that need to be read  He previously worked in [employer] and was found to have asbestos in his pleural plaques  This has been on follow-up  His previous CT scan from July 2019 showed stability of the blocks and no significant degree of fibrosis  He denies any weight loss or anorexia or hemoptysis  He is a previous smoker and occasionally has some shortness of breath on exertion  He also has occasional cough which is mostly clear phlegm  No wheezing no chest pain no palpitations  No swelling of feet    Currently is not on any inhaler or oxygen  His previous CT scan showed evidence of bilateral apical emphysema  He has history of hypertension and has been on treatment with amlodipine and atenolol  He also has previously reported pulmonary hypertension    The following portions of the patient's history were reviewed in this encounter and updated as appropriate:   Review of Systems   Constitutional: Negative for chills, fatigue, fever and unexpected weight change  HENT: Positive for rhinorrhea  Negative for congestion, postnasal drip, sneezing, sore throat and voice change  Respiratory: Positive for cough and shortness of breath  Negative for chest tightness and wheezing  Gastrointestinal: Negative for constipation, diarrhea, nausea and vomiting  Endocrine: Positive for polyuria  Genitourinary: Positive for frequency  Negative for difficulty urinating and urgency  Prostate cancer   Musculoskeletal: Negative for arthralgias, back pain, gait problem and joint swelling  Skin: Negative for pallor and rash  Allergic/Immunologic: Positive for environmental allergies  Neurological: Positive for dizziness  Negative for speech difficulty and light-headedness  Hematological: Bruises/bleeds easily  Psychiatric/Behavioral: Negative for sleep disturbance  The patient is not nervous/anxious  Objective:    Physical Exam  Vitals signs reviewed  Constitutional:       General: He is not in acute distress  Appearance: Normal appearance  He is normal weight  He is not ill-appearing, toxic-appearing or diaphoretic  HENT:      Head: Normocephalic  Eyes:      General: No scleral icterus  Conjunctiva/sclera: Conjunctivae normal    Neck:      Musculoskeletal: Neck supple  No muscular tenderness  Cardiovascular:      Rate and Rhythm: Normal rate and regular rhythm  Heart sounds: Normal heart sounds  No murmur  Pulmonary:      Effort: No respiratory distress  Breath sounds: No stridor   Rales (Bilateral basal inspiratory crackles) present  No wheezing or rhonchi  Chest:      Chest wall: No tenderness  Abdominal:      General: Abdomen is flat  Bowel sounds are normal  There is no distension  Palpations: Abdomen is soft  Tenderness: There is no abdominal tenderness  Lymphadenopathy:      Cervical: No cervical adenopathy  Skin:     General: Skin is warm and dry  Coloration: Skin is not jaundiced or pale  Findings: Bruising present  Neurological:      Mental Status: He is alert and oriented to person, place, and time  Psychiatric:         Mood and Affect: Mood normal          Behavior: Behavior normal          Thought Content:  Thought content normal          Lab Review:   Reviewed

## 2020-08-11 NOTE — LETTER
August 11, 2020     Ana Boss DO  [address]  [employer]    Patient: Kip Bansal   YOB: 1930   Date of Visit: 8/11/2020       Dear Dr Jacquelin Stockton:    Thank you for referring Kip Bansal to me for evaluation  Below are my notes for this consultation  If you have questions, please do not hesitate to call me  I look forward to following your patient along with you  Sincerely,        Raúl Mayorga MD        CC: No Recipients  Raúl Mayorga MD  8/11/2020 11:13 AM  Sign when Signing Visit  Assessment:    1  PRISCILLA (obstructive sleep apnea)     2  Asbestos-induced pleural plaque  CT chest without contrast   3  Pulmonary emphysema, unspecified emphysema type ([address] )     4  Essential hypertension       Chest x-ray:  Pleural pleural  CT of chest performed on *** without contrast revealed pleural plaques and emphysema  Pulmonary function tests:  Diffusion defect     Plan:     Continue CPAP therapy for obstructive sleep apnea at the current settings  Compliant by history  Compliance Records are not currently available  Getting clinical benefit from CPAP therapy  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 needs repeat CT scan  Has emphysema related to previous smoking  Currently not on any inhaler or oxygen  Observe for now  Hypertension blood pressure stable    Subjective:     Patient ID: Kip Bansal is a 80 y o  male  Chief Complaint:  Mr Javier Humphries has obstructive sleep apnea and has been on CPAP therapy  He states that he uses the CPAP regularly and is comfortable with the mask and pressure  He has no significant daytime sleepiness or interrupted sleep or morning headache or daytime tiredness  He feels that the CPAP therapy is helping him and is very motivated to continue on CPAP therapy  The [employer] is his DME and is getting supplies as needed  His latest CPAP compliance records are not currently available    He has a computer chip that need to be read  He previously worked in [employer] and was found to have asbestos in his pleural plaques  This has been on follow-up  His previous CT scan from July 2019 showed stability of the blocks and no significant degree of fibrosis  He denies any weight loss or anorexia or hemoptysis  He is a previous smoker and occasionally has some shortness of breath on exertion  He also has occasional cough which is mostly clear phlegm  No wheezing no chest pain no palpitations  No swelling of feet  Currently is not on any inhaler or oxygen  His previous CT scan showed evidence of bilateral apical emphysema  He has history of hypertension and has been on treatment with amlodipine and atenolol  He also has previously reported pulmonary hypertension    The following portions of the patient's history were reviewed in this encounter and updated as appropriate:   Review of Systems   Constitutional: Negative for chills, fatigue, fever and unexpected weight change  HENT: Positive for rhinorrhea  Negative for congestion, postnasal drip, sneezing, sore throat and voice change  Respiratory: Positive for cough and shortness of breath  Negative for chest tightness and wheezing  Gastrointestinal: Negative for constipation, diarrhea, nausea and vomiting  Endocrine: Positive for polyuria  Genitourinary: Positive for frequency  Negative for difficulty urinating and urgency  Prostate cancer   Musculoskeletal: Negative for arthralgias, back pain, gait problem and joint swelling  Skin: Negative for pallor and rash  Allergic/Immunologic: Positive for environmental allergies  Neurological: Positive for dizziness  Negative for speech difficulty and light-headedness  Hematological: Bruises/bleeds easily  Psychiatric/Behavioral: Negative for sleep disturbance  The patient is not nervous/anxious  Objective:    Physical Exam  Vitals signs reviewed     Constitutional:       General: He is not in acute distress  Appearance: Normal appearance  He is normal weight  He is not ill-appearing, toxic-appearing or diaphoretic  HENT:      Head: Normocephalic  Eyes:      General: No scleral icterus  Conjunctiva/sclera: Conjunctivae normal    Neck:      Musculoskeletal: Neck supple  No muscular tenderness  Cardiovascular:      Rate and Rhythm: Normal rate and regular rhythm  Heart sounds: Normal heart sounds  No murmur  Pulmonary:      Effort: No respiratory distress  Breath sounds: No stridor  Rales (Bilateral basal inspiratory crackles) present  No wheezing or rhonchi  Chest:      Chest wall: No tenderness  Abdominal:      General: Abdomen is flat  Bowel sounds are normal  There is no distension  Palpations: Abdomen is soft  Tenderness: There is no abdominal tenderness  Lymphadenopathy:      Cervical: No cervical adenopathy  Skin:     General: Skin is warm and dry  Coloration: Skin is not jaundiced or pale  Findings: Bruising present  Neurological:      Mental Status: He is alert and oriented to person, place, and time  Psychiatric:         Mood and Affect: Mood normal          Behavior: Behavior normal          Thought Content:  Thought content normal          Lab Review:   Reviewed

## 2020-08-11 NOTE — LETTER
August 11, 2020     Kira Guerrero DO  [address]  [facility]    Patient: Jamia Madera   YOB: 1930   Date of Visit: 8/11/2020       Dear Dr Bryanna Blue:    Thank you for referring Jamia Madera to me for evaluation  Below are my notes for this consultation  If you have questions, please do not hesitate to call me  I look forward to following your patient along with you  Sincerely,        Ba Alfonso MD        CC: No Recipients  Ba Alfonso MD  8/11/2020 11:13 AM  Sign when Signing Visit  Assessment:    1  PRISCILLA (obstructive sleep apnea)     2  Asbestos-induced pleural plaque  CT chest without contrast   3  Pulmonary emphysema, unspecified emphysema type ([address] )     4  Essential hypertension       Chest x-ray:  Pleural pleural  CT of chest performed on *** without contrast revealed pleural plaques and emphysema  Pulmonary function tests:  Diffusion defect     Plan:     Continue CPAP therapy for obstructive sleep apnea at the current settings  Compliant by history  Compliance Records are not currently available  Getting clinical benefit from CPAP therapy  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 needs repeat CT scan  Has emphysema related to previous smoking  Currently not on any inhaler or oxygen  Observe for now  Hypertension blood pressure stable    Subjective:     Patient ID: Jamia Madera is a 80 y o  male  Chief Complaint:  Mr Yang Graves has obstructive sleep apnea and has been on CPAP therapy  He states that he uses the CPAP regularly and is comfortable with the mask and pressure  He has no significant daytime sleepiness or interrupted sleep or morning headache or daytime tiredness  He feels that the CPAP therapy is helping him and is very motivated to continue on CPAP therapy  [facility] [employer] is his DME and is getting supplies as needed  His latest CPAP compliance records are not currently available    He has a computer chip that need to be read  He previously worked in [employer] and was found to have asbestos in his pleural plaques  This has been on follow-up  His previous CT scan from July 2019 showed stability of the blocks and no significant degree of fibrosis  He denies any weight loss or anorexia or hemoptysis  He is a previous smoker and occasionally has some shortness of breath on exertion  He also has occasional cough which is mostly clear phlegm  No wheezing no chest pain no palpitations  No swelling of feet  Currently is not on any inhaler or oxygen  His previous CT scan showed evidence of bilateral apical emphysema  He has history of hypertension and has been on treatment with amlodipine and atenolol  He also has previously reported pulmonary hypertension    The following portions of the patient's history were reviewed in this encounter and updated as appropriate:   Review of Systems   Constitutional: Negative for chills, fatigue, fever and unexpected weight change  HENT: Positive for rhinorrhea  Negative for congestion, postnasal drip, sneezing, sore throat and voice change  Respiratory: Positive for cough and shortness of breath  Negative for chest tightness and wheezing  Gastrointestinal: Negative for constipation, diarrhea, nausea and vomiting  Endocrine: Positive for polyuria  Genitourinary: Positive for frequency  Negative for difficulty urinating and urgency  Prostate cancer   Musculoskeletal: Negative for arthralgias, back pain, gait problem and joint swelling  Skin: Negative for pallor and rash  Allergic/Immunologic: Positive for environmental allergies  Neurological: Positive for dizziness  Negative for speech difficulty and light-headedness  Hematological: Bruises/bleeds easily  Psychiatric/Behavioral: Negative for sleep disturbance  The patient is not nervous/anxious  Objective:    Physical Exam  Vitals signs reviewed     Constitutional:       General: He is not in acute distress  Appearance: Normal appearance  He is normal weight  He is not ill-appearing, toxic-appearing or diaphoretic  HENT:      Head: Normocephalic  Eyes:      General: No scleral icterus  Conjunctiva/sclera: Conjunctivae normal    Neck:      Musculoskeletal: Neck supple  No muscular tenderness  Cardiovascular:      Rate and Rhythm: Normal rate and regular rhythm  Heart sounds: Normal heart sounds  No murmur  Pulmonary:      Effort: No respiratory distress  Breath sounds: No stridor  Rales (Bilateral basal inspiratory crackles) present  No wheezing or rhonchi  Chest:      Chest wall: No tenderness  Abdominal:      General: Abdomen is flat  Bowel sounds are normal  There is no distension  Palpations: Abdomen is soft  Tenderness: There is no abdominal tenderness  Lymphadenopathy:      Cervical: No cervical adenopathy  Skin:     General: Skin is warm and dry  Coloration: Skin is not jaundiced or pale  Findings: Bruising present  Neurological:      Mental Status: He is alert and oriented to person, place, and time  Psychiatric:         Mood and Affect: Mood normal          Behavior: Behavior normal          Thought Content:  Thought content normal          Lab Review:   Reviewed

## 2020-08-11 NOTE — ASSESSMENT & PLAN NOTE
He was noted to have calcified bilateral pleural plaques on previous imaging  He has history of exposure to asbestos while serving in the [employer] He is asymptomatic  His PFT showed reduced uncorrected DLCO  He has inspiratory crackles at both lung bases  His previous HRCT scan of the chest showed bilateral calcified pleural plaques   His repeat CT scan also showed stability in July 2019

## 2020-08-19 ENCOUNTER — HOSPITAL ENCOUNTER (OUTPATIENT)
Dept: CT IMAGING | Facility: HOSPITAL | Age: 85
Discharge: HOME | End: 2020-08-19
Attending: INTERNAL MEDICINE
Payer: MEDICARE

## 2020-08-19 DIAGNOSIS — J92.0 ASBESTOS-INDUCED PLEURAL PLAQUE: ICD-10-CM

## 2020-08-19 PROCEDURE — 71250 CT THORAX DX C-: CPT

## 2020-08-19 PROCEDURE — G1004 CDSM NDSC: HCPCS

## 2020-08-25 ENCOUNTER — APPOINTMENT (OUTPATIENT)
Dept: LAB | Facility: CLINIC | Age: 85
End: 2020-08-25
Payer: MEDICARE

## 2020-08-25 DIAGNOSIS — R97.0 ELEVATED CEA: ICD-10-CM

## 2020-08-25 DIAGNOSIS — I12.9 NEPHROSCLEROSIS ARTERIOLAR, STAGE 1-4 OR UNSPECIFIED CHRONIC KIDNEY DISEASE: ICD-10-CM

## 2020-08-25 DIAGNOSIS — G89.3 NEOPLASM RELATED PAIN: ICD-10-CM

## 2020-08-25 DIAGNOSIS — D37.4: Primary | ICD-10-CM

## 2020-08-25 LAB
ALBUMIN SERPL BCP-MCNC: 3.5 G/DL (ref 3.5–5)
ALP SERPL-CCNC: 110 U/L (ref 46–116)
ALT SERPL W P-5'-P-CCNC: 15 U/L (ref 12–78)
ANION GAP SERPL CALCULATED.3IONS-SCNC: 6 MMOL/L (ref 4–13)
AST SERPL W P-5'-P-CCNC: 17 U/L (ref 5–45)
BASOPHILS # BLD AUTO: 0.04 THOUSANDS/ΜL (ref 0–0.1)
BASOPHILS NFR BLD AUTO: 1 % (ref 0–1)
BILIRUB SERPL-MCNC: 0.34 MG/DL (ref 0.2–1)
BUN SERPL-MCNC: 45 MG/DL (ref 5–25)
CALCIUM SERPL-MCNC: 8.5 MG/DL (ref 8.3–10.1)
CEA SERPL-MCNC: 2.9 NG/ML (ref 0–3)
CHLORIDE SERPL-SCNC: 113 MMOL/L (ref 100–108)
CO2 SERPL-SCNC: 26 MMOL/L (ref 21–32)
CREAT SERPL-MCNC: 2.83 MG/DL (ref 0.6–1.3)
EOSINOPHIL # BLD AUTO: 0.14 THOUSAND/ΜL (ref 0–0.61)
EOSINOPHIL NFR BLD AUTO: 2 % (ref 0–6)
ERYTHROCYTE [DISTWIDTH] IN BLOOD BY AUTOMATED COUNT: 14.3 % (ref 11.6–15.1)
GFR SERPL CREATININE-BSD FRML MDRD: 19 ML/MIN/1.73SQ M
GLUCOSE P FAST SERPL-MCNC: 89 MG/DL (ref 65–99)
HCT VFR BLD AUTO: 39.9 % (ref 36.5–49.3)
HGB BLD-MCNC: 11.9 G/DL (ref 12–17)
IMM GRANULOCYTES # BLD AUTO: 0.16 THOUSAND/UL (ref 0–0.2)
IMM GRANULOCYTES NFR BLD AUTO: 2 % (ref 0–2)
LYMPHOCYTES # BLD AUTO: 2.03 THOUSANDS/ΜL (ref 0.6–4.47)
LYMPHOCYTES NFR BLD AUTO: 30 % (ref 14–44)
MCH RBC QN AUTO: 30 PG (ref 26.8–34.3)
MCHC RBC AUTO-ENTMCNC: 29.8 G/DL (ref 31.4–37.4)
MCV RBC AUTO: 101 FL (ref 82–98)
MONOCYTES # BLD AUTO: 1.43 THOUSAND/ΜL (ref 0.17–1.22)
MONOCYTES NFR BLD AUTO: 21 % (ref 4–12)
NEUTROPHILS # BLD AUTO: 3.06 THOUSANDS/ΜL (ref 1.85–7.62)
NEUTS SEG NFR BLD AUTO: 44 % (ref 43–75)
NRBC BLD AUTO-RTO: 0 /100 WBCS
PLATELET # BLD AUTO: 62 THOUSANDS/UL (ref 149–390)
POTASSIUM SERPL-SCNC: 5 MMOL/L (ref 3.5–5.3)
PROT SERPL-MCNC: 7.4 G/DL (ref 6.4–8.2)
RBC # BLD AUTO: 3.97 MILLION/UL (ref 3.88–5.62)
SODIUM SERPL-SCNC: 145 MMOL/L (ref 136–145)
WBC # BLD AUTO: 6.86 THOUSAND/UL (ref 4.31–10.16)

## 2020-08-25 PROCEDURE — 36415 COLL VENOUS BLD VENIPUNCTURE: CPT

## 2020-08-25 PROCEDURE — 85025 COMPLETE CBC W/AUTO DIFF WBC: CPT

## 2020-08-25 PROCEDURE — 80053 COMPREHEN METABOLIC PANEL: CPT

## 2020-08-25 PROCEDURE — 82378 CARCINOEMBRYONIC ANTIGEN: CPT

## 2020-08-26 ENCOUNTER — TELEPHONE (OUTPATIENT)
Dept: NEPHROLOGY | Facility: CLINIC | Age: 85
End: 2020-08-26

## 2020-08-26 NOTE — TELEPHONE ENCOUNTER
Message left on patient's voicemail that labs are stable per  Dr Madai Bustillos     ----- Message from Rafat Mallory MD sent at 8/26/2020  7:59 AM EDT -----  Call and let him know creatinine was stable  ----- Message -----  From: Lab, Background User  Sent: 8/25/2020   7:49 PM EDT  To: Rafat Mallory MD

## 2020-11-10 ENCOUNTER — OFFICE VISIT (OUTPATIENT)
Dept: NEPHROLOGY | Facility: CLINIC | Age: 85
End: 2020-11-10
Payer: MEDICARE

## 2020-11-10 VITALS
HEART RATE: 70 BPM | BODY MASS INDEX: 19.24 KG/M2 | WEIGHT: 122.6 LBS | DIASTOLIC BLOOD PRESSURE: 64 MMHG | HEIGHT: 67 IN | SYSTOLIC BLOOD PRESSURE: 134 MMHG | TEMPERATURE: 97.8 F

## 2020-11-10 DIAGNOSIS — I12.9 NEPHROSCLEROSIS ARTERIOLAR, STAGE 1-4 OR UNSPECIFIED CHRONIC KIDNEY DISEASE: Primary | ICD-10-CM

## 2020-11-10 DIAGNOSIS — IMO0002 SOLITARY KIDNEY: ICD-10-CM

## 2020-11-10 PROCEDURE — 99214 OFFICE O/P EST MOD 30 MIN: CPT | Performed by: INTERNAL MEDICINE

## 2020-12-04 ENCOUNTER — LAB (OUTPATIENT)
Dept: LAB | Facility: CLINIC | Age: 85
End: 2020-12-04
Payer: MEDICARE

## 2020-12-04 ENCOUNTER — TRANSCRIBE ORDERS (OUTPATIENT)
Dept: LAB | Facility: CLINIC | Age: 85
End: 2020-12-04

## 2020-12-04 DIAGNOSIS — R10.9 STOMACH ACHE: ICD-10-CM

## 2020-12-04 DIAGNOSIS — D37.4 VILLOUS ADENOMA OF COLON: ICD-10-CM

## 2020-12-04 DIAGNOSIS — D37.4 VILLOUS ADENOMA OF COLON: Primary | ICD-10-CM

## 2020-12-04 LAB
ALBUMIN SERPL BCP-MCNC: 3.7 G/DL (ref 3.5–5)
ALP SERPL-CCNC: 130 U/L (ref 46–116)
ALT SERPL W P-5'-P-CCNC: 21 U/L (ref 12–78)
AMORPH URATE CRY URNS QL MICRO: NORMAL /HPF
ANION GAP SERPL CALCULATED.3IONS-SCNC: 8 MMOL/L (ref 4–13)
AST SERPL W P-5'-P-CCNC: 22 U/L (ref 5–45)
BACTERIA UR QL AUTO: NORMAL /HPF
BASOPHILS # BLD MANUAL: 0 THOUSAND/UL (ref 0–0.1)
BASOPHILS NFR MAR MANUAL: 0 % (ref 0–1)
BILIRUB SERPL-MCNC: 0.42 MG/DL (ref 0.2–1)
BILIRUB UR QL STRIP: NEGATIVE
BUN SERPL-MCNC: 53 MG/DL (ref 5–25)
CALCIUM SERPL-MCNC: 9.3 MG/DL (ref 8.3–10.1)
CHLORIDE SERPL-SCNC: 116 MMOL/L (ref 100–108)
CLARITY UR: ABNORMAL
CO2 SERPL-SCNC: 23 MMOL/L (ref 21–32)
COARSE GRAN CASTS URNS QL MICRO: NORMAL /LPF
COLOR UR: YELLOW
CREAT SERPL-MCNC: 2.8 MG/DL (ref 0.6–1.3)
EOSINOPHIL # BLD MANUAL: 0 THOUSAND/UL (ref 0–0.4)
EOSINOPHIL NFR BLD MANUAL: 0 % (ref 0–6)
ERYTHROCYTE [DISTWIDTH] IN BLOOD BY AUTOMATED COUNT: 14.6 % (ref 11.6–15.1)
FINE GRAN CASTS URNS QL MICRO: NORMAL /LPF
GFR SERPL CREATININE-BSD FRML MDRD: 19 ML/MIN/1.73SQ M
GLUCOSE P FAST SERPL-MCNC: 81 MG/DL (ref 65–99)
GLUCOSE UR STRIP-MCNC: NEGATIVE MG/DL
HCT VFR BLD AUTO: 39.9 % (ref 36.5–49.3)
HGB BLD-MCNC: 11.9 G/DL (ref 12–17)
HGB UR QL STRIP.AUTO: ABNORMAL
KETONES UR STRIP-MCNC: NEGATIVE MG/DL
LEUKOCYTE ESTERASE UR QL STRIP: NEGATIVE
LIPASE SERPL-CCNC: 707 U/L (ref 73–393)
LYMPHOCYTES # BLD AUTO: 17 % (ref 14–44)
LYMPHOCYTES # BLD AUTO: 2.04 THOUSAND/UL (ref 0.6–4.47)
MCH RBC QN AUTO: 29.7 PG (ref 26.8–34.3)
MCHC RBC AUTO-ENTMCNC: 29.8 G/DL (ref 31.4–37.4)
MCV RBC AUTO: 100 FL (ref 82–98)
MONOCYTES # BLD AUTO: 1.68 THOUSAND/UL (ref 0–1.22)
MONOCYTES NFR BLD: 14 % (ref 4–12)
MYELOCYTES NFR BLD MANUAL: 1 % (ref 0–1)
NEUTROPHILS # BLD MANUAL: 7.8 THOUSAND/UL (ref 1.85–7.62)
NEUTS SEG NFR BLD AUTO: 65 % (ref 43–75)
NITRITE UR QL STRIP: NEGATIVE
NON-SQ EPI CELLS URNS QL MICRO: NORMAL /HPF
NRBC BLD AUTO-RTO: 0 /100 WBCS
PH UR STRIP.AUTO: 6 [PH]
PLATELET # BLD AUTO: 79 THOUSANDS/UL (ref 149–390)
PLATELET BLD QL SMEAR: ABNORMAL
PMV BLD AUTO: 13.8 FL (ref 8.9–12.7)
POTASSIUM SERPL-SCNC: 4.8 MMOL/L (ref 3.5–5.3)
PROT SERPL-MCNC: 7.5 G/DL (ref 6.4–8.2)
PROT UR STRIP-MCNC: ABNORMAL MG/DL
RBC # BLD AUTO: 4.01 MILLION/UL (ref 3.88–5.62)
RBC #/AREA URNS AUTO: NORMAL /HPF
RBC MORPH BLD: NORMAL
SODIUM SERPL-SCNC: 147 MMOL/L (ref 136–145)
SP GR UR STRIP.AUTO: 1.02 (ref 1–1.03)
UROBILINOGEN UR QL STRIP.AUTO: 0.2 E.U./DL
VARIANT LYMPHS # BLD AUTO: 3 %
WBC # BLD AUTO: 12 THOUSAND/UL (ref 4.31–10.16)
WBC #/AREA URNS AUTO: NORMAL /HPF

## 2020-12-04 PROCEDURE — 85007 BL SMEAR W/DIFF WBC COUNT: CPT

## 2020-12-04 PROCEDURE — 36415 COLL VENOUS BLD VENIPUNCTURE: CPT

## 2020-12-04 PROCEDURE — 80053 COMPREHEN METABOLIC PANEL: CPT

## 2020-12-04 PROCEDURE — 85027 COMPLETE CBC AUTOMATED: CPT

## 2020-12-04 PROCEDURE — 83690 ASSAY OF LIPASE: CPT

## 2020-12-04 PROCEDURE — 81001 URINALYSIS AUTO W/SCOPE: CPT

## 2021-01-13 ENCOUNTER — TRANSCRIBE ORDERS (OUTPATIENT)
Dept: ADMINISTRATIVE | Facility: HOSPITAL | Age: 86
End: 2021-01-13

## 2021-01-13 DIAGNOSIS — R82.90 ABNORMAL URINALYSIS: ICD-10-CM

## 2021-01-13 DIAGNOSIS — R10.9 STOMACH ACHE: Primary | ICD-10-CM

## 2021-01-13 DIAGNOSIS — R74.8 ACID PHOSPHATASE ELEVATED: ICD-10-CM

## 2021-01-13 DIAGNOSIS — R10.9 UNSPECIFIED ABDOMINAL PAIN: ICD-10-CM

## 2021-01-14 ENCOUNTER — LAB (OUTPATIENT)
Dept: LAB | Facility: CLINIC | Age: 86
End: 2021-01-14
Payer: MEDICARE

## 2021-01-14 DIAGNOSIS — R10.9 STOMACH ACHE: ICD-10-CM

## 2021-01-14 DIAGNOSIS — R82.90 ABNORMAL URINALYSIS: ICD-10-CM

## 2021-01-14 DIAGNOSIS — R74.8 ACID PHOSPHATASE ELEVATED: ICD-10-CM

## 2021-01-14 LAB
ALBUMIN SERPL BCP-MCNC: 3.6 G/DL (ref 3.5–5)
ALP SERPL-CCNC: 127 U/L (ref 46–116)
ALT SERPL W P-5'-P-CCNC: 18 U/L (ref 12–78)
ANION GAP SERPL CALCULATED.3IONS-SCNC: 7 MMOL/L (ref 4–13)
AST SERPL W P-5'-P-CCNC: 22 U/L (ref 5–45)
BACTERIA UR QL AUTO: NORMAL /HPF
BASOPHILS # BLD MANUAL: 0.13 THOUSAND/UL (ref 0–0.1)
BASOPHILS NFR MAR MANUAL: 1 % (ref 0–1)
BILIRUB SERPL-MCNC: 0.38 MG/DL (ref 0.2–1)
BILIRUB UR QL STRIP: NEGATIVE
BUN SERPL-MCNC: 47 MG/DL (ref 5–25)
CALCIUM SERPL-MCNC: 9.7 MG/DL (ref 8.3–10.1)
CHLORIDE SERPL-SCNC: 113 MMOL/L (ref 100–108)
CLARITY UR: CLEAR
CO2 SERPL-SCNC: 26 MMOL/L (ref 21–32)
COLOR UR: YELLOW
CREAT SERPL-MCNC: 2.84 MG/DL (ref 0.6–1.3)
EOSINOPHIL # BLD MANUAL: 0.13 THOUSAND/UL (ref 0–0.4)
EOSINOPHIL NFR BLD MANUAL: 1 % (ref 0–6)
ERYTHROCYTE [DISTWIDTH] IN BLOOD BY AUTOMATED COUNT: 13.8 % (ref 11.6–15.1)
GFR SERPL CREATININE-BSD FRML MDRD: 19 ML/MIN/1.73SQ M
GLUCOSE P FAST SERPL-MCNC: 84 MG/DL (ref 65–99)
GLUCOSE UR STRIP-MCNC: NEGATIVE MG/DL
HCT VFR BLD AUTO: 39.6 % (ref 36.5–49.3)
HGB BLD-MCNC: 11.5 G/DL (ref 12–17)
HGB UR QL STRIP.AUTO: ABNORMAL
KETONES UR STRIP-MCNC: NEGATIVE MG/DL
LEUKOCYTE ESTERASE UR QL STRIP: NEGATIVE
LIPASE SERPL-CCNC: 481 U/L (ref 73–393)
LYMPHOCYTES # BLD AUTO: 27 % (ref 14–44)
LYMPHOCYTES # BLD AUTO: 3.39 THOUSAND/UL (ref 0.6–4.47)
MACROCYTES BLD QL AUTO: PRESENT
MCH RBC QN AUTO: 29.2 PG (ref 26.8–34.3)
MCHC RBC AUTO-ENTMCNC: 29 G/DL (ref 31.4–37.4)
MCV RBC AUTO: 101 FL (ref 82–98)
MONOCYTES # BLD AUTO: 3.14 THOUSAND/UL (ref 0–1.22)
MONOCYTES NFR BLD: 25 % (ref 4–12)
NEUTROPHILS # BLD MANUAL: 5.52 THOUSAND/UL (ref 1.85–7.62)
NEUTS SEG NFR BLD AUTO: 44 % (ref 43–75)
NITRITE UR QL STRIP: NEGATIVE
NON-SQ EPI CELLS URNS QL MICRO: NORMAL /HPF
NRBC BLD AUTO-RTO: 0 /100 WBCS
PH UR STRIP.AUTO: 6 [PH]
PLATELET # BLD AUTO: 61 THOUSANDS/UL (ref 149–390)
PLATELET BLD QL SMEAR: ABNORMAL
PMV BLD AUTO: 13.7 FL (ref 8.9–12.7)
POTASSIUM SERPL-SCNC: 4.8 MMOL/L (ref 3.5–5.3)
PROT SERPL-MCNC: 7.3 G/DL (ref 6.4–8.2)
PROT UR STRIP-MCNC: ABNORMAL MG/DL
RBC # BLD AUTO: 3.94 MILLION/UL (ref 3.88–5.62)
RBC #/AREA URNS AUTO: NORMAL /HPF
SODIUM SERPL-SCNC: 146 MMOL/L (ref 136–145)
SP GR UR STRIP.AUTO: 1.02 (ref 1–1.03)
TOTAL CELLS COUNTED SPEC: 100
UROBILINOGEN UR QL STRIP.AUTO: 0.2 E.U./DL
VARIANT LYMPHS # BLD AUTO: 2 %
WBC # BLD AUTO: 12.54 THOUSAND/UL (ref 4.31–10.16)
WBC #/AREA URNS AUTO: NORMAL /HPF

## 2021-01-14 PROCEDURE — 85027 COMPLETE CBC AUTOMATED: CPT

## 2021-01-14 PROCEDURE — 85007 BL SMEAR W/DIFF WBC COUNT: CPT

## 2021-01-14 PROCEDURE — 36415 COLL VENOUS BLD VENIPUNCTURE: CPT

## 2021-01-14 PROCEDURE — 83690 ASSAY OF LIPASE: CPT

## 2021-01-14 PROCEDURE — 80053 COMPREHEN METABOLIC PANEL: CPT

## 2021-01-14 PROCEDURE — 81001 URINALYSIS AUTO W/SCOPE: CPT | Performed by: SURGERY

## 2021-01-17 ENCOUNTER — HOSPITAL ENCOUNTER (OUTPATIENT)
Dept: ULTRASOUND IMAGING | Facility: HOSPITAL | Age: 86
Discharge: HOME | End: 2021-01-17
Attending: SURGERY
Payer: MEDICARE

## 2021-01-17 DIAGNOSIS — R10.9 UNSPECIFIED ABDOMINAL PAIN: ICD-10-CM

## 2021-01-17 PROCEDURE — 76705 ECHO EXAM OF ABDOMEN: CPT

## 2021-02-21 ENCOUNTER — HOSPITAL ENCOUNTER (OUTPATIENT)
Dept: MRI IMAGING | Facility: HOSPITAL | Age: 86
Discharge: HOME | End: 2021-02-21
Attending: SURGERY
Payer: MEDICARE

## 2021-02-21 ENCOUNTER — TRANSCRIBE ORDERS (OUTPATIENT)
Dept: ADMINISTRATIVE | Facility: HOSPITAL | Age: 86
End: 2021-02-21

## 2021-02-21 DIAGNOSIS — R10.9 UNSPECIFIED ABDOMINAL PAIN: ICD-10-CM

## 2021-02-21 DIAGNOSIS — R10.9 STOMACH ACHE: Primary | ICD-10-CM

## 2021-02-21 PROCEDURE — G1004 CDSM NDSC: HCPCS

## 2021-02-21 PROCEDURE — 74181 MRI ABDOMEN W/O CONTRAST: CPT

## 2021-02-21 PROCEDURE — 76377 3D RENDER W/INTRP POSTPROCES: CPT

## 2021-03-01 ENCOUNTER — LAB (OUTPATIENT)
Dept: LAB | Facility: CLINIC | Age: 86
End: 2021-03-01
Payer: MEDICARE

## 2021-03-01 DIAGNOSIS — I12.9 NEPHROSCLEROSIS ARTERIOLAR, STAGE 1-4 OR UNSPECIFIED CHRONIC KIDNEY DISEASE: ICD-10-CM

## 2021-03-01 LAB
ANION GAP SERPL CALCULATED.3IONS-SCNC: 4 MMOL/L (ref 4–13)
BUN SERPL-MCNC: 50 MG/DL (ref 5–25)
CALCIUM SERPL-MCNC: 8.6 MG/DL (ref 8.3–10.1)
CHLORIDE SERPL-SCNC: 116 MMOL/L (ref 100–108)
CO2 SERPL-SCNC: 25 MMOL/L (ref 21–32)
CREAT SERPL-MCNC: 2.77 MG/DL (ref 0.6–1.3)
GFR SERPL CREATININE-BSD FRML MDRD: 19 ML/MIN/1.73SQ M
GLUCOSE P FAST SERPL-MCNC: 89 MG/DL (ref 65–99)
POTASSIUM SERPL-SCNC: 5.1 MMOL/L (ref 3.5–5.3)
SODIUM SERPL-SCNC: 145 MMOL/L (ref 136–145)

## 2021-03-01 PROCEDURE — 80048 BASIC METABOLIC PNL TOTAL CA: CPT

## 2021-03-01 PROCEDURE — 36415 COLL VENOUS BLD VENIPUNCTURE: CPT

## 2021-03-11 ENCOUNTER — APPOINTMENT (OUTPATIENT)
Dept: LAB | Facility: CLINIC | Age: 86
End: 2021-03-11
Payer: MEDICARE

## 2021-03-11 DIAGNOSIS — R10.9 STOMACH ACHE: ICD-10-CM

## 2021-03-11 LAB
ALBUMIN SERPL BCP-MCNC: 3.7 G/DL (ref 3.5–5)
ALP SERPL-CCNC: 127 U/L (ref 46–116)
ALT SERPL W P-5'-P-CCNC: 20 U/L (ref 12–78)
ANION GAP SERPL CALCULATED.3IONS-SCNC: 5 MMOL/L (ref 4–13)
AST SERPL W P-5'-P-CCNC: 22 U/L (ref 5–45)
BASOPHILS # BLD AUTO: 0.05 THOUSANDS/ΜL (ref 0–0.1)
BASOPHILS NFR BLD AUTO: 0 % (ref 0–1)
BILIRUB SERPL-MCNC: 0.42 MG/DL (ref 0.2–1)
BUN SERPL-MCNC: 47 MG/DL (ref 5–25)
CALCIUM SERPL-MCNC: 8.8 MG/DL (ref 8.3–10.1)
CHLORIDE SERPL-SCNC: 111 MMOL/L (ref 100–108)
CO2 SERPL-SCNC: 27 MMOL/L (ref 21–32)
CREAT SERPL-MCNC: 2.93 MG/DL (ref 0.6–1.3)
EOSINOPHIL # BLD AUTO: 0.16 THOUSAND/ΜL (ref 0–0.61)
EOSINOPHIL NFR BLD AUTO: 1 % (ref 0–6)
ERYTHROCYTE [DISTWIDTH] IN BLOOD BY AUTOMATED COUNT: 13.6 % (ref 11.6–15.1)
GFR SERPL CREATININE-BSD FRML MDRD: 18 ML/MIN/1.73SQ M
GLUCOSE P FAST SERPL-MCNC: 91 MG/DL (ref 65–99)
HCT VFR BLD AUTO: 39.3 % (ref 36.5–49.3)
HGB BLD-MCNC: 11.5 G/DL (ref 12–17)
IMM GRANULOCYTES # BLD AUTO: 0.5 THOUSAND/UL (ref 0–0.2)
IMM GRANULOCYTES NFR BLD AUTO: 4 % (ref 0–2)
LIPASE SERPL-CCNC: 529 U/L (ref 73–393)
LYMPHOCYTES # BLD AUTO: 2.57 THOUSANDS/ΜL (ref 0.6–4.47)
LYMPHOCYTES NFR BLD AUTO: 21 % (ref 14–44)
MCH RBC QN AUTO: 28.9 PG (ref 26.8–34.3)
MCHC RBC AUTO-ENTMCNC: 29.3 G/DL (ref 31.4–37.4)
MCV RBC AUTO: 99 FL (ref 82–98)
MONOCYTES # BLD AUTO: 3.23 THOUSAND/ΜL (ref 0.17–1.22)
MONOCYTES NFR BLD AUTO: 26 % (ref 4–12)
NEUTROPHILS # BLD AUTO: 5.78 THOUSANDS/ΜL (ref 1.85–7.62)
NEUTS SEG NFR BLD AUTO: 48 % (ref 43–75)
NRBC BLD AUTO-RTO: 0 /100 WBCS
PLATELET # BLD AUTO: 67 THOUSANDS/UL (ref 149–390)
PMV BLD AUTO: 13.2 FL (ref 8.9–12.7)
POTASSIUM SERPL-SCNC: 4.8 MMOL/L (ref 3.5–5.3)
PROT SERPL-MCNC: 7.3 G/DL (ref 6.4–8.2)
RBC # BLD AUTO: 3.98 MILLION/UL (ref 3.88–5.62)
SODIUM SERPL-SCNC: 143 MMOL/L (ref 136–145)
WBC # BLD AUTO: 12.29 THOUSAND/UL (ref 4.31–10.16)

## 2021-03-11 PROCEDURE — 85025 COMPLETE CBC W/AUTO DIFF WBC: CPT

## 2021-03-11 PROCEDURE — 36415 COLL VENOUS BLD VENIPUNCTURE: CPT

## 2021-03-11 PROCEDURE — 80053 COMPREHEN METABOLIC PANEL: CPT

## 2021-03-11 PROCEDURE — 83690 ASSAY OF LIPASE: CPT

## 2021-03-15 ENCOUNTER — OFFICE VISIT (OUTPATIENT)
Dept: NEPHROLOGY | Facility: CLINIC | Age: 86
End: 2021-03-15
Payer: MEDICARE

## 2021-03-15 VITALS
BODY MASS INDEX: 18.8 KG/M2 | HEART RATE: 70 BPM | SYSTOLIC BLOOD PRESSURE: 142 MMHG | HEIGHT: 67 IN | WEIGHT: 119.8 LBS | DIASTOLIC BLOOD PRESSURE: 80 MMHG

## 2021-03-15 DIAGNOSIS — I12.9 NEPHROSCLEROSIS ARTERIOLAR, STAGE 1-4 OR UNSPECIFIED CHRONIC KIDNEY DISEASE: Primary | ICD-10-CM

## 2021-03-15 DIAGNOSIS — IMO0002 SOLITARY KIDNEY: ICD-10-CM

## 2021-03-15 PROCEDURE — 99214 OFFICE O/P EST MOD 30 MIN: CPT | Performed by: INTERNAL MEDICINE

## 2021-03-15 NOTE — LETTER
March 15, 2021     Luis E Faith DO  [address]    Patient: Valerio Benedict   YOB: 1930   Date of Visit: 3/15/2021       Dear Dr Zofia Jordan:    Thank you for referring Valerio Benedict to me for evaluation  Below are my notes for this consultation  If you have questions, please do not hesitate to call me  I look forward to following your patient along with you  Sincerely,        Lian Mora MD        CC: No Recipients  Lian Mora MD  3/15/2021  4:50 PM  Sign when Signing Visit   NEPHROLOGY PROGRESS NOTE    Valerio Benedict 80 y o  male MRN: [id number]  Unit/Bed#:  Encounter: [id number]  Reason for Consult:  Chronic renal insufficiency and solitary kidney    Patient is here for follow-up with his wife he has been doing well states his weight is been stable although he has lost weight in the last year  Other than that he is in good spirits denied any complaints says he is urinating well  We reviewed his medications  ASSESSMENT/PLAN:  1  Renal    Patient has chronic renal insufficiency in his solitary kidney 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   He has nephrosclerosis with no significant abnormalities in his urine and creatinine today is towards the higher end of his baseline range 2 9 but looking back over years there has been is very slow changes in renal function are relatively stable  Creatinine is 2 9 blood pressure is excellent  For now just continue current medications with no changes  Continue current medications  Monitor blood pressure  Labs and follow-up as scheduled    He was told to call if there is any problems or concerns before next visit  SUBJECTIVE:  Review of Systems   Constitution: Negative for chills, fever, malaise/fatigue and night sweats  HENT: Negative  Eyes: Negative  Cardiovascular: Negative  Negative for chest pain, dyspnea on exertion, leg swelling and orthopnea  Respiratory: Negative    Negative for cough, shortness of breath, sputum production and wheezing  Gastrointestinal: Negative for abdominal pain, diarrhea, nausea and vomiting  Genitourinary: Negative for dysuria, flank pain, hematuria and incomplete emptying  Neurological: Negative for dizziness, focal weakness, headaches and weakness  Psychiatric/Behavioral: Negative for altered mental status, depression, hallucinations and hypervigilance  OBJECTIVE:  Current Weight: Weight - Scale: 54 3 kg (119 lb 12 8 oz)  [email or web address] com:     Blood pressure 142/80, pulse 70, height 5' 7" (1 702 m), weight 54 3 kg (119 lb 12 8 oz)  , Body mass index is 18 76 kg/m²  [unfilled]    Physical Exam: /80 (BP Location: Right arm, Patient Position: Sitting, Cuff Size: Standard)   Pulse 70   Ht 5' 7" (1 702 m)   Wt 54 3 kg (119 lb 12 8 oz)   BMI 18 76 kg/m²   Physical Exam  Constitutional:       General: He is not in acute distress  Appearance: He is not ill-appearing or diaphoretic  HENT:      Head: Normocephalic and atraumatic  Nose:      Comments: Mask     Mouth/Throat:      Comments: Mask  Eyes:      General: No scleral icterus  Extraocular Movements: Extraocular movements intact  Neck:      Musculoskeletal: Normal range of motion and neck supple  Cardiovascular:      Rate and Rhythm: Normal rate and regular rhythm  Heart sounds: No friction rub  No gallop  Comments: No edema  Pulmonary:      Effort: Pulmonary effort is normal  No respiratory distress  Breath sounds: Normal breath sounds  No wheezing, rhonchi or rales  Abdominal:      General: Bowel sounds are normal  There is no distension  Palpations: Abdomen is soft  Tenderness: There is no abdominal tenderness  There is no rebound  Neurological:      General: No focal deficit present  Mental Status: He is alert and oriented to person, place, and time  Mental status is at baseline     Psychiatric:         Mood and Affect: Mood normal          Behavior: Behavior normal          Thought Content:  Thought content normal          Judgment: Judgment normal          Medications:    Current Outpatient Medications:     allopurinol (ZYLOPRIM) 100 mg tablet, Take 100 mg by mouth daily, Disp: , Rfl:     amLODIPine (NORVASC) 2 5 mg tablet, Take 5 mg by mouth daily , Disp: , Rfl:     atenolol (TENORMIN) 50 mg tablet, Take 50 mg by mouth daily, Disp: , Rfl:     atorvastatin (LIPITOR) 10 mg tablet, Take 10 mg by mouth daily, Disp: , Rfl:     cholecalciferol (VITAMIN D3) 1,000 units tablet, Take 1,000 Units by mouth daily, Disp: , Rfl:     famotidine (PEPCID) 20 mg tablet, Take 1 tablet (20 mg total) by mouth 2 (two) times a day, Disp: 30 tablet, Rfl: 5    folic acid (FOLVITE) 1 mg tablet, Take 800 mcg by mouth daily , Disp: , Rfl:     Probiotic Product (PROBIOTIC-10 PO), Take 1 tablet by mouth daily, Disp: , Rfl:     thiamine (VITAMIN B1) 100 mg tablet, Take 100 mg by mouth daily, Disp: , Rfl:     Multiple Vitamins-Minerals (CENTRUM SILVER PO), Take by mouth daily, Disp: , Rfl:     Multiple Vitamins-Minerals (ICAPS AREDS 2 PO), Take by mouth daily, Disp: , Rfl:     Laboratory Results:  Lab Results   Component Value Date    WBC 12 29 (H) 03/11/2021    HGB 11 5 (L) 03/11/2021    HCT 39 3 03/11/2021    MCV 99 (H) 03/11/2021    PLT 67 (L) 03/11/2021     Lab Results   Component Value Date    SODIUM 143 03/11/2021    K 4 8 03/11/2021     (H) 03/11/2021    CO2 27 03/11/2021    BUN 47 (H) 03/11/2021    CREATININE 2 93 (H) 03/11/2021    GLUC 85 02/01/2020    CALCIUM 8 8 03/11/2021     Lab Results   Component Value Date    CALCIUM 8 8 03/11/2021    PHOS 4 0 04/02/2019     No results found for: LABPROT

## 2021-03-15 NOTE — PROGRESS NOTES
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   He has nephrosclerosis with no significant abnormalities in his urine and creatinine today is towards the higher end of his baseline range 2 9 but looking back over years there has been is very slow changes in renal function are relatively stable  Creatinine is 2 9 blood pressure is excellent  For now just continue current medications with no changes  Continue current medications  Monitor blood pressure  Labs and follow-up as scheduled    He was told to call if there is any problems or concerns before next visit  SUBJECTIVE:  Review of Systems   Constitution: Negative for chills, fever, malaise/fatigue and night sweats  HENT: Negative  Eyes: Negative  Cardiovascular: Negative  Negative for chest pain, dyspnea on exertion, leg swelling and orthopnea  Respiratory: Negative  Negative for cough, shortness of breath, sputum production and wheezing  Gastrointestinal: Negative for abdominal pain, diarrhea, nausea and vomiting  Genitourinary: Negative for dysuria, flank pain, hematuria and incomplete emptying  Neurological: Negative for dizziness, focal weakness, headaches and weakness  Psychiatric/Behavioral: Negative for altered mental status, depression, hallucinations and hypervigilance         OBJECTIVE:  Current Weight: Weight - Scale: 54 3 kg (119 lb 12 8 oz)  [email or web address] com:     Blood pressure 142/80, pulse 70, height 5' 7" (1 702 m), weight 54 3 kg (119 lb 12 8 oz)  , Body mass index is 18 76 kg/m²  [unfilled]    Physical Exam: /80 (BP Location: Right arm, Patient Position: Sitting, Cuff Size: Standard)   Pulse 70   Ht 5' 7" (1 702 m)   Wt 54 3 kg (119 lb 12 8 oz)   BMI 18 76 kg/m²   Physical Exam  Constitutional:       General: He is not in acute distress  Appearance: He is not ill-appearing or diaphoretic  HENT:      Head: Normocephalic and atraumatic  Nose:      Comments: Mask     Mouth/Throat:      Comments: Mask  Eyes:      General: No scleral icterus  Extraocular Movements: Extraocular movements intact  Neck:      Musculoskeletal: Normal range of motion and neck supple  Cardiovascular:      Rate and Rhythm: Normal rate and regular rhythm  Heart sounds: No friction rub  No gallop  Comments: No edema  Pulmonary:      Effort: Pulmonary effort is normal  No respiratory distress  Breath sounds: Normal breath sounds  No wheezing, rhonchi or rales  Abdominal:      General: Bowel sounds are normal  There is no distension  Palpations: Abdomen is soft  Tenderness: There is no abdominal tenderness  There is no rebound  Neurological:      General: No focal deficit present  Mental Status: He is alert and oriented to person, place, and time  Mental status is at baseline  Psychiatric:         Mood and Affect: Mood normal          Behavior: Behavior normal          Thought Content:  Thought content normal          Judgment: Judgment normal          Medications:    Current Outpatient Medications:     allopurinol (ZYLOPRIM) 100 mg tablet, Take 100 mg by mouth daily, Disp: , Rfl:     amLODIPine (NORVASC) 2 5 mg tablet, Take 5 mg by mouth daily , Disp: , Rfl:     atenolol (TENORMIN) 50 mg tablet, Take 50 mg by mouth daily, Disp: , Rfl:     atorvastatin (LIPITOR) 10 mg tablet, Take 10 mg by mouth daily, Disp: , Rfl:     cholecalciferol (VITAMIN D3) 1,000 units tablet, Take 1,000 Units by mouth daily, Disp: , Rfl:     famotidine (PEPCID) 20 mg tablet, Take 1 tablet (20 mg total) by mouth 2 (two) times a day, Disp: 30 tablet, Rfl: 5    folic acid (FOLVITE) 1 mg tablet, Take 800 mcg by mouth daily , Disp: , Rfl:     Probiotic Product (PROBIOTIC-10 PO), Take 1 tablet by mouth daily, Disp: , Rfl:     thiamine (VITAMIN B1) 100 mg tablet, Take 100 mg by mouth daily, Disp: , Rfl:     Multiple Vitamins-Minerals (CENTRUM SILVER PO), Take by mouth daily, Disp: , Rfl:     Multiple Vitamins-Minerals (ICAPS AREDS 2 PO), Take by mouth daily, Disp: , Rfl:     Laboratory Results:  Lab Results   Component Value Date    WBC 12 29 (H) 03/11/2021    HGB 11 5 (L) 03/11/2021    HCT 39 3 03/11/2021    MCV 99 (H) 03/11/2021    PLT 67 (L) 03/11/2021     Lab Results   Component Value Date    SODIUM 143 03/11/2021    K 4 8 03/11/2021     (H) 03/11/2021    CO2 27 03/11/2021    BUN 47 (H) 03/11/2021    CREATININE 2 93 (H) 03/11/2021    GLUC 85 02/01/2020    CALCIUM 8 8 03/11/2021     Lab Results   Component Value Date    CALCIUM 8 8 03/11/2021    PHOS 4 0 04/02/2019     No results found for: LABPROT

## 2021-03-15 NOTE — PATIENT INSTRUCTIONS
You are here for follow-up sounds like her health is doing very well you have no complaints for me today  You received her COVID vaccination  Your creatinine level which is the blood test your kidney was 1 9 and looking back even 5 years ago the level was the same as it is so there has been no progression  You have a single kidney you likely have nephrosclerosis or aging in that kidney so for now we really monitor with blood pressure control which is excellent      No changes in medications  Follow-up as scheduled

## 2021-03-23 ENCOUNTER — OFFICE VISIT (OUTPATIENT)
Dept: PULMONOLOGY | Facility: CLINIC | Age: 86
End: 2021-03-23
Payer: MEDICARE

## 2021-03-23 VITALS
BODY MASS INDEX: 18.99 KG/M2 | HEIGHT: 67 IN | SYSTOLIC BLOOD PRESSURE: 128 MMHG | OXYGEN SATURATION: 97 % | DIASTOLIC BLOOD PRESSURE: 66 MMHG | HEART RATE: 74 BPM | TEMPERATURE: 97.7 F | WEIGHT: 121 LBS

## 2021-03-23 DIAGNOSIS — J92.0 ASBESTOS-INDUCED PLEURAL PLAQUE: Primary | ICD-10-CM

## 2021-03-23 DIAGNOSIS — J43.9 PULMONARY EMPHYSEMA, UNSPECIFIED EMPHYSEMA TYPE (HCC): ICD-10-CM

## 2021-03-23 DIAGNOSIS — G47.33 OSA (OBSTRUCTIVE SLEEP APNEA): ICD-10-CM

## 2021-03-23 PROBLEM — R91.1 PULMONARY NODULE LESS THAN 6 MM IN DIAMETER WITH LOW RISK FOR MALIGNANT NEOPLASM: Status: ACTIVE | Noted: 2021-03-23

## 2021-03-23 PROBLEM — Z91.89 PULMONARY NODULE LESS THAN 6 MM IN DIAMETER WITH LOW RISK FOR MALIGNANT NEOPLASM: Status: ACTIVE | Noted: 2021-03-23

## 2021-03-23 PROCEDURE — 99213 OFFICE O/P EST LOW 20 MIN: CPT | Performed by: INTERNAL MEDICINE

## 2021-03-23 RX ORDER — METRONIDAZOLE 500 MG/1
500 TABLET ORAL EVERY 8 HOURS
COMMUNITY
Start: 2021-03-16 | End: 2021-04-07 | Stop reason: ALTCHOICE

## 2021-03-23 RX ORDER — CEPHALEXIN 500 MG/1
500 CAPSULE ORAL EVERY 8 HOURS
COMMUNITY
Start: 2021-03-16 | End: 2021-04-07 | Stop reason: ALTCHOICE

## 2021-03-23 NOTE — ASSESSMENT & PLAN NOTE
Pt with a hx of PRISCILLA for more then 10 years  He was always doing great with the machine    But he started to have nocturia lately, which keeps him up frombed 3-4 times at night  CPAP compliance record is curretly not available  - follow up compliance records  - patient is encouraged to continue using cpap machine as tolerated

## 2021-03-23 NOTE — PROGRESS NOTES
Assessment/Plan:    Asbestos-induced pleural plaque  Patient served  in a [employer] and was exposed to asbestos  Ct scan in 8/2019 showed pleural plaques  There was no interstitial lung disease    - follow up CT scan with next visit in 6 month      PRISCILLA (obstructive sleep apnea)  Pt with a hx of PRISCILLA for more then 10 years  He was always doing great with the machine  But he started to have nocturia lately, which keeps him up frombed 3-4 times at night  CPAP compliance record is curretly not available  - follow up compliance records  - patient is encouraged to continue using cpap machine as tolerated    Pulmonary nodule less than 6 mm in diameter with low risk for malignant neoplasm  3 mm noncalcified nodule right upper lobe  Unknown long-term stability  Pt quit smoking many years ago  He is not under increased risk for lung cancer   There is no need for follow up imaging        Subjective:      Patient ID: Gladis Chavira is a 80 y o  male  HPI    80 ruiz old gentleman with pmh of PRISCILLA, prostate cancer,urinary incontinence, asbestos exposure presents for a follow up for PRISCILLA  He has been compliant with cpap machine for more then 10 years  However he was only using it half of the night hours lately  Nocturia keeps him up at night and he has troubles falling back to sleep if the cpap  is on  Other wise the patient is doing great  He denies sob at rest or on exertion  He has no cough or wheezing  He denies unexpected  Weight loss  CT scan of his chest showed emphysema I the past but not on the latest CT scan  Patient has pleural plaques seen on CT scan since 8/2019  He needs a follow up Ct that will be ordered with the next visit in 6 month  Review of Systems   Constitutional: Negative for activity change, appetite change, chills, fatigue, fever and unexpected weight change  HENT: Negative  Negative for nosebleeds, postnasal drip, rhinorrhea and sinus pain  Eyes: Negative      Respiratory: Negative for apnea, cough, choking, chest tightness, shortness of breath and wheezing  Cardiovascular: Negative for chest pain, palpitations and leg swelling  Gastrointestinal: Negative for abdominal pain  Genitourinary: Negative for difficulty urinating and dysuria  Musculoskeletal: Negative for joint swelling  Skin: Negative for rash  Neurological: Negative for dizziness, syncope, weakness, light-headedness, numbness and headaches  Hematological: Negative for adenopathy  Psychiatric/Behavioral: Negative  Objective:      /66 (BP Location: Left arm, Patient Position: Sitting, Cuff Size: Adult)   Pulse 74   Temp 97 7 °F (36 5 °C) (Tympanic)   Ht 5' 7" (1 702 m)   Wt 54 9 kg (121 lb)   SpO2 97%   BMI 18 95 kg/m²          Physical Exam  Constitutional:       General: He is not in acute distress  Appearance: He is well-developed  HENT:      Head: Normocephalic and atraumatic  Right Ear: External ear normal       Left Ear: External ear normal    Eyes:      Extraocular Movements: Extraocular movements intact  Conjunctiva/sclera: Conjunctivae normal    Neck:      Thyroid: No thyromegaly  Vascular: No JVD  Cardiovascular:      Rate and Rhythm: Normal rate and regular rhythm  Heart sounds: Normal heart sounds  No murmur  No friction rub  No gallop  Pulmonary:      Effort: Pulmonary effort is normal  No respiratory distress  Breath sounds: Normal breath sounds  No stridor  No wheezing, rhonchi or rales  Chest:      Chest wall: No tenderness  Abdominal:      Palpations: Abdomen is soft  Musculoskeletal:         General: No tenderness  Skin:     General: Skin is warm  Findings: No erythema or rash  Neurological:      Mental Status: He is alert and oriented to person, place, and time  Mental status is at baseline  Cranial Nerves: No cranial nerve deficit     Psychiatric:         Mood and Affect: Mood normal          Behavior: Behavior normal          This note was not shared with the patient due to patient requested

## 2021-03-23 NOTE — ASSESSMENT & PLAN NOTE
Patient served  in a [employer] and was exposed to asbestos  Ct scan in 8/2019 showed pleural plaques  There was no interstitial lung disease    - follow up CT scan with next visit in 6 month

## 2021-03-23 NOTE — ASSESSMENT & PLAN NOTE
3 mm noncalcified nodule right upper lobe  Unknown long-term stability  Pt quit smoking many years ago  He is not under increased risk for lung cancer     There is no need for follow up imaging

## 2021-03-25 ENCOUNTER — PREP FOR PROCEDURE (OUTPATIENT)
Dept: GASTROENTEROLOGY | Facility: CLINIC | Age: 86
End: 2021-03-25

## 2021-03-25 ENCOUNTER — TELEPHONE (OUTPATIENT)
Dept: GASTROENTEROLOGY | Facility: CLINIC | Age: 86
End: 2021-03-25

## 2021-03-25 DIAGNOSIS — K85.90 ACUTE PANCREATITIS, UNSPECIFIED COMPLICATION STATUS, UNSPECIFIED PANCREATITIS TYPE: Primary | ICD-10-CM

## 2021-03-25 NOTE — TELEPHONE ENCOUNTER
Did order for procedure, just awaiting to hear from Geoff at [facility] to be able to assign a date  Will check early next week

## 2021-03-25 NOTE — TELEPHONE ENCOUNTER
Dr Recinos's office called and stated he spoke with you regarding this patient in scheduling an EUS  Please advise if patient is to have cytology/fna and the diagnosis  Do you need to see this patient prior to having procedure also  Thank you!

## 2021-03-25 NOTE — TELEPHONE ENCOUNTER
----- Message from Charo Mills MD sent at 3/25/2021  3:47 PM EDT -----  Please schedule him for EGD with EUS to evaluate pancreas for recurrent pancreatitis without cytology

## 2021-04-06 ENCOUNTER — ANESTHESIA EVENT (OUTPATIENT)
Dept: GASTROENTEROLOGY | Facility: HOSPITAL | Age: 86
End: 2021-04-06

## 2021-04-07 ENCOUNTER — HOSPITAL ENCOUNTER (OUTPATIENT)
Dept: GASTROENTEROLOGY | Facility: HOSPITAL | Age: 86
Setting detail: OUTPATIENT SURGERY
Discharge: HOME | End: 2021-04-07
Attending: INTERNAL MEDICINE | Admitting: INTERNAL MEDICINE
Payer: MEDICARE

## 2021-04-07 ENCOUNTER — ANESTHESIA (OUTPATIENT)
Dept: GASTROENTEROLOGY | Facility: HOSPITAL | Age: 86
End: 2021-04-07

## 2021-04-07 VITALS
SYSTOLIC BLOOD PRESSURE: 185 MMHG | TEMPERATURE: 97.8 F | DIASTOLIC BLOOD PRESSURE: 88 MMHG | HEIGHT: 67 IN | BODY MASS INDEX: 18.52 KG/M2 | OXYGEN SATURATION: 97 % | WEIGHT: 118 LBS | RESPIRATION RATE: 18 BRPM | HEART RATE: 65 BPM

## 2021-04-07 DIAGNOSIS — K85.90 ACUTE PANCREATITIS, UNSPECIFIED COMPLICATION STATUS, UNSPECIFIED PANCREATITIS TYPE: ICD-10-CM

## 2021-04-07 PROCEDURE — 88305 TISSUE EXAM BY PATHOLOGIST: CPT | Performed by: PATHOLOGY

## 2021-04-07 PROCEDURE — 43237 ENDOSCOPIC US EXAM ESOPH: CPT | Performed by: INTERNAL MEDICINE

## 2021-04-07 PROCEDURE — 43239 EGD BIOPSY SINGLE/MULTIPLE: CPT | Performed by: INTERNAL MEDICINE

## 2021-04-07 RX ORDER — SODIUM CHLORIDE, SODIUM LACTATE, POTASSIUM CHLORIDE, CALCIUM CHLORIDE 600; 310; 30; 20 MG/100ML; MG/100ML; MG/100ML; MG/100ML
125 INJECTION, SOLUTION INTRAVENOUS CONTINUOUS
Status: DISCONTINUED | OUTPATIENT
Start: 2021-04-07 | End: 2021-04-11 | Stop reason: HOSPADM

## 2021-04-07 RX ORDER — SODIUM CHLORIDE, SODIUM LACTATE, POTASSIUM CHLORIDE, CALCIUM CHLORIDE 600; 310; 30; 20 MG/100ML; MG/100ML; MG/100ML; MG/100ML
INJECTION, SOLUTION INTRAVENOUS CONTINUOUS PRN
Status: DISCONTINUED | OUTPATIENT
Start: 2021-04-07 | End: 2021-04-07

## 2021-04-07 RX ORDER — LIDOCAINE HYDROCHLORIDE 10 MG/ML
INJECTION, SOLUTION EPIDURAL; INFILTRATION; INTRACAUDAL; PERINEURAL AS NEEDED
Status: DISCONTINUED | OUTPATIENT
Start: 2021-04-07 | End: 2021-04-07

## 2021-04-07 RX ORDER — PROPOFOL 10 MG/ML
INJECTION, EMULSION INTRAVENOUS AS NEEDED
Status: DISCONTINUED | OUTPATIENT
Start: 2021-04-07 | End: 2021-04-07

## 2021-04-07 RX ADMIN — PROPOFOL 30 MG: 10 INJECTION, EMULSION INTRAVENOUS at 08:34

## 2021-04-07 RX ADMIN — PROPOFOL 80 MG: 10 INJECTION, EMULSION INTRAVENOUS at 08:28

## 2021-04-07 RX ADMIN — PROPOFOL 30 MG: 10 INJECTION, EMULSION INTRAVENOUS at 08:30

## 2021-04-07 RX ADMIN — PROPOFOL 20 MG: 10 INJECTION, EMULSION INTRAVENOUS at 08:38

## 2021-04-07 RX ADMIN — SODIUM CHLORIDE, SODIUM LACTATE, POTASSIUM CHLORIDE, AND CALCIUM CHLORIDE: .6; .31; .03; .02 INJECTION, SOLUTION INTRAVENOUS at 08:06

## 2021-04-07 RX ADMIN — LIDOCAINE HYDROCHLORIDE 50 MG: 10 INJECTION, SOLUTION EPIDURAL; INFILTRATION; INTRACAUDAL; PERINEURAL at 08:26

## 2021-04-07 NOTE — H&P
History and Physical -  [facility]  Fidelia Garcia 80 y o  male MRN: [id number]                  HPI: Fidelia Garcia is a 80y o  year old male who presents for eus for abnormal lab with persistent elevated lipase       REVIEW OF SYSTEMS: Per the HPI, and otherwise unremarkable      Historical Information   Past Medical History:   Diagnosis Date    Asbestos-induced pleural plaque     Chronic kidney disease     Chronic renal failure     Colon polyp     Congenital absence of one kidney     CPAP (continuous positive airway pressure) dependence     Heart murmur     High blood pressure     History of Doppler echocardiogram     History of radiation therapy     History of stress test     2017, 2014, 10/2012, 3/24/2011    Hypertension     Left bundle branch block     Observed sleep apnea     Pro[facility] [facility]     Pulmonary emphysema ([facility] [address] )      Past Surgical History:   Procedure Laterality Date    BLADDER SURGERY  2018    CARDIAC CATHETERIZATION  2011    CATARACT EXTRACTION      CHOLECYSTECTOMY      COLONOSCOPY      HERNIA REPAIR      KNEE ARTHROSCOPY  2011    PROSTATE SURGERY      status post radiation for prostate cancer     Social History   Social History     Substance and Sexual Activity   Alcohol Use Yes    Frequency: Monthly or less    Comment: socially      Social History     Substance and Sexual Activity   Drug Use No     Social History     Tobacco Use   Smoking Status Former Smoker    Packs/day: 1 00    Years: 30 00    Pack years: 30 00    Types: Cigarettes    Quit date: 1978    Years since quittin 1   Smokeless Tobacco Never Used   Tobacco Comment    quit  - As per [place]      Family History   Problem Relation Age of Onset    Heart disease Mother     Stroke Mother     Colon cancer Father     Asthma Sister     Coronary artery disease Family     Other Neg Hx         COVID wife       Meds/Allergies     (Not in a hospital admission)      Allergies   Allergen Reactions    Iv Dye [Iodinated Diagnostic Agents]        Objective     BP (!) 184/84   Pulse 57   Temp 97 5 °F (36 4 °C) (Temporal)   Resp 21   Ht 5' 7" (1 702 m)   Wt 53 5 kg (118 lb)   SpO2 100%   BMI 18 48 kg/m²       PHYSICAL EXAM    Gen: NAD  CV: RRR  CHEST: Clear  ABD: soft, NT/ND  EXT: no edema      ASSESSMENT/PLAN:  This is a 80y o  year old male here for EGD and EUS, and he is stable and optimized for his procedure

## 2021-04-07 NOTE — ANESTHESIA POSTPROCEDURE EVALUATION
Post-Op Assessment Note    CV Status:  Stable    Pain management: adequate     Mental Status:  Sleepy   Hydration Status:  Euvolemic   PONV Controlled:  Controlled   Airway Patency:  Patent      Post Op Vitals Reviewed: Yes      Staff: CRNA         No complications documented      BP  168/77   Temp      Pulse  57   Resp   16   SpO2   100

## 2021-04-07 NOTE — DISCHARGE INSTRUCTIONS
Upper Endoscopic Gastrointestinal Ultrasonography   WHAT YOU NEED TO KNOW:   An upper gastrointestinal endoscopic ultrasound is done to look at the different parts of your upper gastrointestinal (GI) tract  The upper GI tract includes the esophagus, stomach, and duodenum (first part of the small intestine)  This procedure is used to help diagnose and treat diseases that affect the upper GI tract  DISCHARGE INSTRUCTIONS:   Seek care immediately if:   · You have sudden, severe abdominal pain  · You have problems swallowing  · You have a large amount of black, sticky bowel movements or blood in your bowel movements  · You have sudden trouble breathing  · You feel weak, lightheaded, or faint or your heart beats faster than normal for you  Contact your healthcare provider at *** if:   · You have a fever and chills  · You have nausea or are vomiting  · Your abdomen is bloated or feels full and hard  · You have abdominal pain  · You have a large amount of black, sticky bowel movements or blood in your bowel movements  · You have not had a bowel movement for 3 days after your procedure  · You have rash or hives  · You lose your appetite, your skin feels itchy, and your skin turns yellow  · You have questions or concerns about your procedure  Self-care:   ·      Rest when you feel it is needed  You may be drowsy for up to 24 hours after your procedure  Return to your daily activities as directed  ·       Ask when you can eat regular foods  Healthy foods include fruits, vegetables, whole-grain breads, low-fat dairy products, beans, lean meats, and fish  ·       Relieve a sore throat with ice chips, liquids, or lozenges as directed  Follow up with your healthcare provider as directed: Write down your questions so you remember to ask them during your visits        If you take a blood thinner, please review the specific instructions from your endoscopist about when you should resume it  These can be found in the Recommendation and Your Medication list sections of this After Visit Summary

## 2021-04-07 NOTE — ANESTHESIA PREPROCEDURE EVALUATION
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 chronic kidney disease   (+) Solitary kidney      NEURO/PSYCH   (+) History of prostate cancer      PULMONARY   (+) PRISCILLA (obstructive sleep apnea)        Physical Exam    Airway    Mallampati score: I  TM Distance: >3 FB  Neck ROM: full     Dental   No notable dental hx     Cardiovascular  Rhythm: regular, Rate: normal, Cardiovascular exam normal    Pulmonary  Breath sounds clear to auscultation,     Other Findings        Anesthesia Plan  ASA Score- 3     Anesthesia Type- IV sedation with anesthesia with ASA Monitors  Additional Monitors:   Airway Plan:           Plan Factors-Exercise tolerance (METS): >4 METS  Chart reviewed  Patient is not a current smoker  Patient instructed to abstain from smoking on day of procedure  Patient did not smoke on day of surgery  Obstructive sleep apnea risk education given perioperatively  Induction- intravenous  Postoperative Plan-     Informed Consent- Anesthetic plan and risks discussed with patient  I personally reviewed this patient with the CRNA  Discussed and agreed on the Anesthesia Plan with the CRNA  Elicia Chavarria

## 2021-05-10 ENCOUNTER — OFFICE VISIT (OUTPATIENT)
Dept: GASTROENTEROLOGY | Facility: CLINIC | Age: 86
End: 2021-05-10
Payer: MEDICARE

## 2021-05-10 VITALS
WEIGHT: 119 LBS | HEART RATE: 63 BPM | DIASTOLIC BLOOD PRESSURE: 63 MMHG | HEIGHT: 67 IN | BODY MASS INDEX: 18.68 KG/M2 | SYSTOLIC BLOOD PRESSURE: 171 MMHG

## 2021-05-10 DIAGNOSIS — R10.13 EPIGASTRIC PAIN: Primary | ICD-10-CM

## 2021-05-10 DIAGNOSIS — R10.13 EPIGASTRIC PAIN: ICD-10-CM

## 2021-05-10 PROCEDURE — 99204 OFFICE O/P NEW MOD 45 MIN: CPT | Performed by: INTERNAL MEDICINE

## 2021-05-10 RX ORDER — PANTOPRAZOLE SODIUM 40 MG/1
TABLET, DELAYED RELEASE ORAL
Qty: 90 TABLET | Refills: 1 | Status: SHIPPED | OUTPATIENT
Start: 2021-05-10

## 2021-05-10 RX ORDER — PANTOPRAZOLE SODIUM 40 MG/1
40 TABLET, DELAYED RELEASE ORAL DAILY
Qty: 30 TABLET | Refills: 2 | Status: SHIPPED | OUTPATIENT
Start: 2021-05-10 | End: 2021-05-10

## 2021-05-10 NOTE — PROGRESS NOTES
[facility] - Outpatient Follow-up Note  Angel Suarez 80 y o  male MRN: [id number]  Encounter: [id number]          ASSESSMENT AND PLAN:      1  Epigastric pain   recent EGD shows evidence of mild to moderate degree of gastritis, biopsy came back negative for Helicobacter pylori infection, endoscopy ultrasound shows normal pancreatic parenchyma, no CBD stone, pancreatic duct appeared normal, patient still having persistent elevated lipase but no sign of pancreatitis or no sign of chronic changes in the pancreas  He denies any chronic alcohol use, he is taking famotidine 40 mg p o  q day, will add Protonix  - pantoprazole (PROTONIX) 40 mg tablet; Take 1 tablet (40 mg total) by mouth daily  Dispense: 30 tablet; Refill: 2    ______________________________________________________________________    SUBJECTIVE:   Patient seen and examined, he is follow-up to EGD with endoscopic ultrasound  He was referred to us by Dr Gladys Jones for persistent elevated lipase, EGD and endoscopic ultrasound was done which shows normal pancreatic duct, normal pancreatic parenchyma, no CBD stone, patient also had a CT scan in the past which did not show any evidence of acute pancreatitis, endoscopic ultrasound shows no sign of chronic pancreatitis, he denies any chronic alcohol use  He is taking famotidine and still having burning pain in the left upper quadrant and also in the epigastric area, he deny any regurgitation or reflux symptoms    REVIEW OF SYSTEMS IS OTHERWISE NEGATIVE        Historical Information   Past Medical History:   Diagnosis Date    Asbestos-induced pleural plaque     Chronic kidney disease     Chronic renal failure     Colon polyp     Congenital absence of one kidney     CPAP (continuous positive airway pressure) dependence     Heart murmur     High blood pressure     History of Doppler echocardiogram 2011    History of radiation therapy     History of stress test     1/2017, 2014, 10/2012, 3/24/2011    Hypertension     Left bundle branch block     Observed sleep apnea     Pro[facility] [address] [facility]     [facility]      Past Surgical History:   Procedure Laterality Date    BLADDER SURGERY  2018    CARDIAC CATHETERIZATION  2011    CATARACT EXTRACTION      CHOLECYSTECTOMY      COLONOSCOPY      HERNIA REPAIR      KNEE ARTHROSCOPY  2011    PROSTATE SURGERY      status post radiation for prostate cancer     Social History   Social History     Substance and Sexual Activity   Alcohol Use Yes    Frequency: Monthly or less    Comment: socially      Social History     Substance and Sexual Activity   Drug Use No     Social History     Tobacco Use   Smoking Status Former Smoker    Packs/day: 1 00    Years: 30 00    Pack years: 30 00    Types: Cigarettes    Quit date: 1978    Years since quittin 2   Smokeless Tobacco Never Used   Tobacco Comment    quit  - As per [place]      Family History   Problem Relation Age of Onset    Heart disease Mother     Stroke Mother     Colon cancer Father     Asthma Sister     Coronary artery disease Family     Other Neg Hx         COVID wife       Meds/Allergies       Current Outpatient Medications:     amLODIPine (NORVASC) 2 5 mg tablet    atenolol (TENORMIN) 50 mg tablet    atorvastatin (LIPITOR) 10 mg tablet    b complex vitamins tablet    cholecalciferol (VITAMIN D3) 1,000 units tablet    famotidine (PEPCID) 20 mg tablet    folic acid (FOLVITE) 1 mg tablet    Probiotic Product (PROBIOTIC-10 PO)    pantoprazole (PROTONIX) 40 mg tablet    Allergies   Allergen Reactions    Iv Dye [Iodinated Diagnostic Agents]            Objective     Blood pressure (!) 171/63, pulse 63, height 5' 7" (1 702 m), weight 54 kg (119 lb)  Body mass index is 18 64 kg/m²        PHYSICAL EXAM:      General Appearance:   Alert, cooperative, no distress   HEENT:   Normocephalic, atraumatic, anicteric      Neck:  Supple, symmetrical, trachea midline   Lungs:   Clear to auscultation bilaterally; no rales, rhonchi or wheezing; respirations unlabored    Heart[de-identified]   Regular rate and rhythm; no murmur, rub, or gallop  Abdomen:   Soft, non-tender, non-distended; normal bowel sounds; no masses, no organomegaly    Genitalia:   Deferred    Rectal:   Deferred    Extremities:  No cyanosis, clubbing or edema    Pulses:  2+ and symmetric    Skin:  No jaundice, rashes, or lesions    Lymph nodes:  No palpable cervical lymphadenopathy        Lab Results:   No visits with results within 1 Day(s) from this visit  Latest known visit with results is:   Hospital Outpatient Visit on 04/07/2021   Component Date Value    Case Report 04/07/2021                      Value:Surgical Pathology Report                         Case: [id number]                                   Authorizing Provider:  Justa Garzon MD           Collected:           04/07/2021 0830              Ordering Location:     [facility]   Received:            04/07/2021 1157                                     Endoscopy                                                                    Pathologist:           Abiodun Cardoso MD                                                                Specimen:    Stomach, bx antrum                                                                         Final Diagnosis 04/07/2021                      Value: This result contains rich text formatting which cannot be displayed here   Additional Information 04/07/2021                      Value: This result contains rich text formatting which cannot be displayed here  Rosario De Souza Gross Description 04/07/2021                      Value: This result contains rich text formatting which cannot be displayed here  Radiology Results:   No results found

## 2021-06-02 ENCOUNTER — APPOINTMENT (OUTPATIENT)
Dept: LAB | Facility: CLINIC | Age: 86
End: 2021-06-02
Payer: MEDICARE

## 2021-06-02 ENCOUNTER — TRANSCRIBE ORDERS (OUTPATIENT)
Dept: LAB | Facility: CLINIC | Age: 86
End: 2021-06-02

## 2021-06-02 DIAGNOSIS — Z85.46 HISTORY OF PROSTATE CANCER: ICD-10-CM

## 2021-06-02 DIAGNOSIS — R10.9 STOMACH ACHE: ICD-10-CM

## 2021-06-02 DIAGNOSIS — R10.9 STOMACH ACHE: Primary | ICD-10-CM

## 2021-06-02 LAB
ALBUMIN SERPL BCP-MCNC: 3.6 G/DL (ref 3.5–5)
ALP SERPL-CCNC: 106 U/L (ref 46–116)
ALT SERPL W P-5'-P-CCNC: 16 U/L (ref 12–78)
ANION GAP SERPL CALCULATED.3IONS-SCNC: 8 MMOL/L (ref 4–13)
ANISOCYTOSIS BLD QL SMEAR: PRESENT
AST SERPL W P-5'-P-CCNC: 27 U/L (ref 5–45)
BASOPHILS # BLD MANUAL: 0.11 THOUSAND/UL (ref 0–0.1)
BASOPHILS NFR MAR MANUAL: 1 % (ref 0–1)
BILIRUB SERPL-MCNC: 0.31 MG/DL (ref 0.2–1)
BUN SERPL-MCNC: 49 MG/DL (ref 5–25)
CALCIUM SERPL-MCNC: 9 MG/DL (ref 8.3–10.1)
CHLORIDE SERPL-SCNC: 114 MMOL/L (ref 100–108)
CO2 SERPL-SCNC: 21 MMOL/L (ref 21–32)
CREAT SERPL-MCNC: 3.17 MG/DL (ref 0.6–1.3)
EOSINOPHIL # BLD MANUAL: 0.11 THOUSAND/UL (ref 0–0.4)
EOSINOPHIL NFR BLD MANUAL: 1 % (ref 0–6)
ERYTHROCYTE [DISTWIDTH] IN BLOOD BY AUTOMATED COUNT: 14.6 % (ref 11.6–15.1)
GFR SERPL CREATININE-BSD FRML MDRD: 16 ML/MIN/1.73SQ M
GLUCOSE P FAST SERPL-MCNC: 88 MG/DL (ref 65–99)
HCT VFR BLD AUTO: 38.7 % (ref 36.5–49.3)
HGB BLD-MCNC: 11.7 G/DL (ref 12–17)
LIPASE SERPL-CCNC: 558 U/L (ref 73–393)
LYMPHOCYTES # BLD AUTO: 1.85 THOUSAND/UL (ref 0.6–4.47)
LYMPHOCYTES # BLD AUTO: 17 % (ref 14–44)
MCH RBC QN AUTO: 29.5 PG (ref 26.8–34.3)
MCHC RBC AUTO-ENTMCNC: 30.2 G/DL (ref 31.4–37.4)
MCV RBC AUTO: 98 FL (ref 82–98)
MONOCYTES # BLD AUTO: 2.61 THOUSAND/UL (ref 0–1.22)
MONOCYTES NFR BLD: 24 % (ref 4–12)
MYELOCYTES NFR BLD MANUAL: 1 % (ref 0–1)
NEUTROPHILS # BLD MANUAL: 6.09 THOUSAND/UL (ref 1.85–7.62)
NEUTS SEG NFR BLD AUTO: 56 % (ref 43–75)
NRBC BLD AUTO-RTO: 0 /100 WBCS
PLATELET # BLD AUTO: 54 THOUSANDS/UL (ref 149–390)
PLATELET BLD QL SMEAR: ABNORMAL
POLYCHROMASIA BLD QL SMEAR: PRESENT
POTASSIUM SERPL-SCNC: 4.7 MMOL/L (ref 3.5–5.3)
PROT SERPL-MCNC: 7.5 G/DL (ref 6.4–8.2)
RBC # BLD AUTO: 3.96 MILLION/UL (ref 3.88–5.62)
RBC MORPH BLD: PRESENT
SODIUM SERPL-SCNC: 143 MMOL/L (ref 136–145)
WBC # BLD AUTO: 10.87 THOUSAND/UL (ref 4.31–10.16)

## 2021-06-02 PROCEDURE — 80053 COMPREHEN METABOLIC PANEL: CPT

## 2021-06-02 PROCEDURE — 83690 ASSAY OF LIPASE: CPT

## 2021-06-02 PROCEDURE — 85027 COMPLETE CBC AUTOMATED: CPT

## 2021-06-02 PROCEDURE — 85007 BL SMEAR W/DIFF WBC COUNT: CPT

## 2021-06-02 PROCEDURE — 36415 COLL VENOUS BLD VENIPUNCTURE: CPT

## 2021-06-24 ENCOUNTER — APPOINTMENT (OUTPATIENT)
Dept: LAB | Facility: CLINIC | Age: 86
End: 2021-06-24
Payer: MEDICARE

## 2021-06-24 DIAGNOSIS — I12.9 NEPHROSCLEROSIS ARTERIOLAR, STAGE 1-4 OR UNSPECIFIED CHRONIC KIDNEY DISEASE: ICD-10-CM

## 2021-06-24 LAB
ANION GAP SERPL CALCULATED.3IONS-SCNC: 4 MMOL/L (ref 4–13)
BUN SERPL-MCNC: 37 MG/DL (ref 5–25)
CALCIUM SERPL-MCNC: 8.8 MG/DL (ref 8.3–10.1)
CHLORIDE SERPL-SCNC: 113 MMOL/L (ref 100–108)
CO2 SERPL-SCNC: 27 MMOL/L (ref 21–32)
CREAT SERPL-MCNC: 2.91 MG/DL (ref 0.6–1.3)
GFR SERPL CREATININE-BSD FRML MDRD: 18 ML/MIN/1.73SQ M
GLUCOSE P FAST SERPL-MCNC: 87 MG/DL (ref 65–99)
POTASSIUM SERPL-SCNC: 4.7 MMOL/L (ref 3.5–5.3)
SODIUM SERPL-SCNC: 144 MMOL/L (ref 136–145)

## 2021-06-24 PROCEDURE — 80048 BASIC METABOLIC PNL TOTAL CA: CPT

## 2021-06-24 PROCEDURE — 36415 COLL VENOUS BLD VENIPUNCTURE: CPT

## 2021-06-25 ENCOUNTER — TELEPHONE (OUTPATIENT)
Dept: NEPHROLOGY | Facility: CLINIC | Age: 86
End: 2021-06-25

## 2021-06-25 NOTE — TELEPHONE ENCOUNTER
----- Message from Franki Storey MD sent at 6/24/2021 12:57 PM EDT -----  Please call let him know his creatinine was a little bit lower which is good    ----- Message -----  From: Lab, Background User  Sent: 6/24/2021  12:45 PM EDT  To: Franki Storey MD

## 2021-06-25 NOTE — TELEPHONE ENCOUNTER
Spoke with patient and made him aware recent labs show creatinine has improved to 2 91  Patient has no questions or concerns at this time

## 2021-07-07 ENCOUNTER — APPOINTMENT (OUTPATIENT)
Dept: LAB | Facility: CLINIC | Age: 86
End: 2021-07-07
Payer: MEDICARE

## 2021-07-07 DIAGNOSIS — E78.5 HYPERLIPIDEMIA, UNSPECIFIED HYPERLIPIDEMIA TYPE: ICD-10-CM

## 2021-07-07 LAB
CHOLEST SERPL-MCNC: 138 MG/DL (ref 50–200)
HDLC SERPL-MCNC: 58 MG/DL
LDLC SERPL CALC-MCNC: 60 MG/DL (ref 0–100)
NONHDLC SERPL-MCNC: 80 MG/DL
PSA SERPL-MCNC: <0.1 NG/ML (ref 0–4)
TRIGL SERPL-MCNC: 98 MG/DL

## 2021-07-07 PROCEDURE — 36415 COLL VENOUS BLD VENIPUNCTURE: CPT

## 2021-07-07 PROCEDURE — 80061 LIPID PANEL: CPT

## 2021-07-07 PROCEDURE — 84153 ASSAY OF PSA TOTAL: CPT

## 2021-07-22 ENCOUNTER — OFFICE VISIT (OUTPATIENT)
Dept: NEPHROLOGY | Facility: CLINIC | Age: 86
End: 2021-07-22
Payer: MEDICARE

## 2021-07-22 VITALS
BODY MASS INDEX: 17.89 KG/M2 | HEART RATE: 80 BPM | HEIGHT: 67 IN | SYSTOLIC BLOOD PRESSURE: 154 MMHG | WEIGHT: 114 LBS | DIASTOLIC BLOOD PRESSURE: 80 MMHG

## 2021-07-22 DIAGNOSIS — I12.9 NEPHROSCLEROSIS ARTERIOLAR, STAGE 1-4 OR UNSPECIFIED CHRONIC KIDNEY DISEASE: Primary | ICD-10-CM

## 2021-07-22 DIAGNOSIS — IMO0002 SOLITARY KIDNEY: ICD-10-CM

## 2021-07-22 PROCEDURE — 1124F ACP DISCUSS-NO DSCNMKR DOCD: CPT | Performed by: INTERNAL MEDICINE

## 2021-07-22 PROCEDURE — 99214 OFFICE O/P EST MOD 30 MIN: CPT | Performed by: INTERNAL MEDICINE

## 2021-07-22 NOTE — PROGRESS NOTES
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  His latest creatinine is 2 9 which is improved from the beginning of the month and looking back is baseline tends to run in the mid to high 2 range so he has been stable  He has no volume overload electrolytes are acceptable including potassium  At this point he has no symptoms related to this he does not have aggressive intrinsic disease and 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  At this point will continue to monitor  I do note that he has had some elevated blood pressures at other doctor visits it is mildly elevated today but I told to watch this carefully and if it is persistently above 170 he should call me we can titrate medications as it is important to keep his blood pressure down to help reduce nephrosclerosis rate  Monitor blood pressure and call if it is persistently over 170  Continue current medications  BMP in follow-up in 4 months    He was told to call if there is any problems or concerns before next visit  SUBJECTIVE:  Review of Systems   Constitutional: Negative for chills, fever, malaise/fatigue and night sweats  Eating okay but says does not eat as much as he used to  HENT: Negative  Eyes: Negative  Cardiovascular: Negative  Negative for chest pain, dyspnea on exertion, leg swelling and orthopnea  Respiratory: Negative  Negative for cough, shortness of breath, sputum production and wheezing  Gastrointestinal: Negative  Negative for bloating, abdominal pain, diarrhea, nausea and vomiting  Genitourinary: Negative for dysuria, flank pain, hematuria and incomplete emptying  Neurological: Negative for dizziness, focal weakness, headaches and weakness  Psychiatric/Behavioral: Negative for altered mental status, depression, hallucinations and hypervigilance  OBJECTIVE:  Current Weight: Weight - Scale: 51 7 kg (114 lb)  [email or web address]:     Blood pressure 154/80, pulse 80, height 5' 7" (1 702 m), weight 51 7 kg (114 lb)  , Body mass index is 17 85 kg/m²  [unfilled]    Physical Exam: /80 (BP Location: Right arm, Patient Position: Sitting, Cuff Size: Standard)   Pulse 80   Ht 5' 7" (1 702 m)   Wt 51 7 kg (114 lb)   BMI 17 85 kg/m²   Physical Exam  Constitutional:       General: He is not in acute distress  Appearance: He is not toxic-appearing or diaphoretic  HENT:      Head: Normocephalic and atraumatic  Nose:      Comments: Wearing mask     Mouth/Throat:      Comments: Wearing mask  Eyes:      General: No scleral icterus  Extraocular Movements: Extraocular movements intact  Cardiovascular:      Rate and Rhythm: Normal rate and regular rhythm  Heart sounds: No friction rub  No gallop  Comments: No significant edema  Pulmonary:      Effort: Pulmonary effort is normal  No respiratory distress  Breath sounds: Normal breath sounds  No wheezing, rhonchi or rales  Abdominal:      General: Bowel sounds are normal  There is no distension  Palpations: Abdomen is soft  Tenderness: There is no abdominal tenderness  There is no rebound  Musculoskeletal:      Cervical back: Normal range of motion and neck supple  Neurological:      General: No focal deficit present  Mental Status: He is alert and oriented to person, place, and time  Mental status is at baseline     Psychiatric:         Mood and Affect: Mood normal          Behavior: Behavior normal          Thought Content:  Thought content normal          Judgment: Judgment normal          Medications:    Current Outpatient Medications:     amLODIPine (NORVASC) 2 5 mg tablet, Take 5 mg by mouth daily , Disp: , Rfl:     atenolol (TENORMIN) 50 mg tablet, Take 50 mg by mouth daily, Disp: , Rfl:     atorvastatin (LIPITOR) 10 mg tablet, Take 10 mg by mouth daily, Disp: , Rfl:     b complex vitamins tablet, Take 1 tablet by mouth daily, Disp: , Rfl:     cholecalciferol (VITAMIN D3) 1,000 units tablet, Take 1,000 Units by mouth daily, Disp: , Rfl:     famotidine (PEPCID) 20 mg tablet, Take 1 tablet (20 mg total) by mouth 2 (two) times a day, Disp: 30 tablet, Rfl: 5    folic acid (FOLVITE) 1 mg tablet, Take 800 mcg by mouth daily , Disp: , Rfl:     pantoprazole (PROTONIX) 40 mg tablet, TAKE 1 TABLET(40 MG) BY MOUTH DAILY, Disp: 90 tablet, Rfl: 1    Probiotic Product (PROBIOTIC-10 PO), Take 1 tablet by mouth daily, Disp: , Rfl:     Laboratory Results:  Lab Results   Component Value Date    WBC 10 87 (H) 06/02/2021    HGB 11 7 (L) 06/02/2021    HCT 38 7 06/02/2021    MCV 98 06/02/2021    PLT 54 (L) 06/02/2021     Lab Results   Component Value Date    SODIUM 144 06/24/2021    K 4 7 06/24/2021     (H) 06/24/2021    CO2 27 06/24/2021    BUN 37 (H) 06/24/2021    CREATININE 2 91 (H) 06/24/2021    GLUC 85 02/01/2020    CALCIUM 8 8 06/24/2021     Lab Results   Component Value Date    CALCIUM 8 8 06/24/2021    PHOS 4 0 04/02/2019     No results found for: LABPROT

## 2021-07-22 NOTE — PATIENT INSTRUCTIONS
You are here for your routine follow-up I am happy to hear that the health has been good  Happy birthday in about 3 weeks  Your creatinine which is the blood test for the kidney function was around 2 8 which is stable over the last couple years without progression  Again you have a solitary kidney since birth you likely have some nephrosclerosis which is aging of this kidney I did not see signs of aggressive kidney disease  Your blood pressure at other physicians has been a little elevated at times so if it is elevated above 170 persistently you can give me a call maybe we can titrate some medications  Today it was more acceptable  Labs and follow-up as scheduled  Monitor blood pressure and call me if it is persistently above 170  Please call if you have any other questions

## 2021-09-28 ENCOUNTER — APPOINTMENT (OUTPATIENT)
Dept: LAB | Facility: CLINIC | Age: 86
End: 2021-09-28
Payer: MEDICARE

## 2021-09-28 DIAGNOSIS — R94.4 NONSPECIFIC ABNORMAL RESULTS OF KIDNEY FUNCTION STUDY: ICD-10-CM

## 2021-09-28 DIAGNOSIS — K63.89 MALAKOPLAKIA OF ILEUM: ICD-10-CM

## 2021-09-28 LAB
ALBUMIN SERPL BCP-MCNC: 3.3 G/DL (ref 3.5–5)
ALP SERPL-CCNC: 113 U/L (ref 46–116)
ALT SERPL W P-5'-P-CCNC: 18 U/L (ref 12–78)
ANION GAP SERPL CALCULATED.3IONS-SCNC: 4 MMOL/L (ref 4–13)
AST SERPL W P-5'-P-CCNC: 24 U/L (ref 5–45)
BASOPHILS # BLD AUTO: 0.06 THOUSANDS/ΜL (ref 0–0.1)
BASOPHILS NFR BLD AUTO: 1 % (ref 0–1)
BILIRUB SERPL-MCNC: 0.38 MG/DL (ref 0.2–1)
BUN SERPL-MCNC: 46 MG/DL (ref 5–25)
CALCIUM ALBUM COR SERPL-MCNC: 9.4 MG/DL (ref 8.3–10.1)
CALCIUM SERPL-MCNC: 8.8 MG/DL (ref 8.3–10.1)
CHLORIDE SERPL-SCNC: 113 MMOL/L (ref 100–108)
CO2 SERPL-SCNC: 24 MMOL/L (ref 21–32)
CREAT SERPL-MCNC: 3.12 MG/DL (ref 0.6–1.3)
EOSINOPHIL # BLD AUTO: 0.1 THOUSAND/ΜL (ref 0–0.61)
EOSINOPHIL NFR BLD AUTO: 1 % (ref 0–6)
ERYTHROCYTE [DISTWIDTH] IN BLOOD BY AUTOMATED COUNT: 14.9 % (ref 11.6–15.1)
GFR SERPL CREATININE-BSD FRML MDRD: 17 ML/MIN/1.73SQ M
GLUCOSE P FAST SERPL-MCNC: 83 MG/DL (ref 65–99)
HCT VFR BLD AUTO: 38.7 % (ref 36.5–49.3)
HGB BLD-MCNC: 11.3 G/DL (ref 12–17)
IMM GRANULOCYTES # BLD AUTO: 0.31 THOUSAND/UL (ref 0–0.2)
IMM GRANULOCYTES NFR BLD AUTO: 3 % (ref 0–2)
LYMPHOCYTES # BLD AUTO: 2.45 THOUSANDS/ΜL (ref 0.6–4.47)
LYMPHOCYTES NFR BLD AUTO: 24 % (ref 14–44)
MCH RBC QN AUTO: 29.3 PG (ref 26.8–34.3)
MCHC RBC AUTO-ENTMCNC: 29.2 G/DL (ref 31.4–37.4)
MCV RBC AUTO: 100 FL (ref 82–98)
MONOCYTES # BLD AUTO: 2.21 THOUSAND/ΜL (ref 0.17–1.22)
MONOCYTES NFR BLD AUTO: 22 % (ref 4–12)
NEUTROPHILS # BLD AUTO: 5.06 THOUSANDS/ΜL (ref 1.85–7.62)
NEUTS SEG NFR BLD AUTO: 49 % (ref 43–75)
NRBC BLD AUTO-RTO: 0 /100 WBCS
PLATELET # BLD AUTO: 90 THOUSANDS/UL (ref 149–390)
PMV BLD AUTO: 13.8 FL (ref 8.9–12.7)
POTASSIUM SERPL-SCNC: 4.8 MMOL/L (ref 3.5–5.3)
PROT SERPL-MCNC: 6.9 G/DL (ref 6.4–8.2)
RBC # BLD AUTO: 3.86 MILLION/UL (ref 3.88–5.62)
SODIUM SERPL-SCNC: 141 MMOL/L (ref 136–145)
WBC # BLD AUTO: 10.19 THOUSAND/UL (ref 4.31–10.16)

## 2021-09-28 PROCEDURE — 85025 COMPLETE CBC W/AUTO DIFF WBC: CPT

## 2021-09-28 PROCEDURE — 80053 COMPREHEN METABOLIC PANEL: CPT

## 2021-09-28 PROCEDURE — 36415 COLL VENOUS BLD VENIPUNCTURE: CPT

## 2021-11-04 PROCEDURE — 88305 TISSUE EXAM BY PATHOLOGIST: CPT | Performed by: PATHOLOGY

## 2021-11-05 ENCOUNTER — LAB REQUISITION (OUTPATIENT)
Dept: LAB | Facility: HOSPITAL | Age: 86
End: 2021-11-05
Payer: MEDICARE

## 2021-11-05 DIAGNOSIS — D48.5 NEOPLASM OF UNCERTAIN BEHAVIOR OF SKIN: ICD-10-CM

## 2021-12-13 ENCOUNTER — APPOINTMENT (OUTPATIENT)
Dept: LAB | Facility: CLINIC | Age: 86
End: 2021-12-13
Payer: MEDICARE

## 2021-12-13 DIAGNOSIS — I12.9 NEPHROSCLEROSIS ARTERIOLAR, STAGE 1-4 OR UNSPECIFIED CHRONIC KIDNEY DISEASE: ICD-10-CM

## 2021-12-13 LAB
ANION GAP SERPL CALCULATED.3IONS-SCNC: 6 MMOL/L (ref 4–13)
BUN SERPL-MCNC: 52 MG/DL (ref 5–25)
CALCIUM SERPL-MCNC: 9.1 MG/DL (ref 8.3–10.1)
CHLORIDE SERPL-SCNC: 113 MMOL/L (ref 100–108)
CO2 SERPL-SCNC: 22 MMOL/L (ref 21–32)
CREAT SERPL-MCNC: 3.19 MG/DL (ref 0.6–1.3)
GFR SERPL CREATININE-BSD FRML MDRD: 16 ML/MIN/1.73SQ M
GLUCOSE P FAST SERPL-MCNC: 100 MG/DL (ref 65–99)
POTASSIUM SERPL-SCNC: 4.8 MMOL/L (ref 3.5–5.3)
SODIUM SERPL-SCNC: 141 MMOL/L (ref 136–145)

## 2021-12-13 PROCEDURE — 36415 COLL VENOUS BLD VENIPUNCTURE: CPT

## 2021-12-13 PROCEDURE — 80048 BASIC METABOLIC PNL TOTAL CA: CPT

## 2021-12-16 ENCOUNTER — OFFICE VISIT (OUTPATIENT)
Dept: NEPHROLOGY | Facility: CLINIC | Age: 86
End: 2021-12-16
Payer: MEDICARE

## 2021-12-16 VITALS
HEART RATE: 70 BPM | WEIGHT: 111 LBS | DIASTOLIC BLOOD PRESSURE: 64 MMHG | HEIGHT: 67 IN | BODY MASS INDEX: 17.42 KG/M2 | SYSTOLIC BLOOD PRESSURE: 127 MMHG

## 2021-12-16 DIAGNOSIS — I12.9 NEPHROSCLEROSIS ARTERIOLAR, STAGE 1-4 OR UNSPECIFIED CHRONIC KIDNEY DISEASE: ICD-10-CM

## 2021-12-16 DIAGNOSIS — Q60.0 SOLITARY KIDNEY, CONGENITAL: ICD-10-CM

## 2021-12-16 DIAGNOSIS — N18.9 CHRONIC KIDNEY DISEASE, UNSPECIFIED CKD STAGE: Primary | ICD-10-CM

## 2021-12-16 PROBLEM — IMO0002 SOLITARY KIDNEY: Status: RESOLVED | Noted: 2020-05-28 | Resolved: 2021-12-16

## 2021-12-16 PROCEDURE — 99214 OFFICE O/P EST MOD 30 MIN: CPT | Performed by: INTERNAL MEDICINE

## 2021-12-27 ENCOUNTER — TELEPHONE (OUTPATIENT)
Dept: PULMONOLOGY | Facility: CLINIC | Age: 86
End: 2021-12-27

## 2022-01-04 ENCOUNTER — TELEMEDICINE (OUTPATIENT)
Dept: PULMONOLOGY | Facility: CLINIC | Age: 87
End: 2022-01-04
Payer: MEDICARE

## 2022-01-04 VITALS — BODY MASS INDEX: 17.89 KG/M2 | WEIGHT: 114 LBS | HEIGHT: 67 IN

## 2022-01-04 DIAGNOSIS — J92.0 ASBESTOS-INDUCED PLEURAL PLAQUE: ICD-10-CM

## 2022-01-04 DIAGNOSIS — Z91.89 PULMONARY NODULE LESS THAN 6 MM IN DIAMETER WITH LOW RISK FOR MALIGNANT NEOPLASM: ICD-10-CM

## 2022-01-04 DIAGNOSIS — R91.1 PULMONARY NODULE LESS THAN 6 MM IN DIAMETER WITH LOW RISK FOR MALIGNANT NEOPLASM: ICD-10-CM

## 2022-01-04 DIAGNOSIS — G47.33 OSA (OBSTRUCTIVE SLEEP APNEA): Primary | ICD-10-CM

## 2022-01-04 PROCEDURE — 99442 PR PHYS/QHP TELEPHONE EVALUATION 11-20 MIN: CPT | Performed by: INTERNAL MEDICINE

## 2022-01-04 NOTE — PROGRESS NOTES
Virtual Brief Visit    Patient is located in the following state in which I hold an active license [place]      Assessment/Plan:    Problem List Items Addressed This Visit        Respiratory    PRISCILLA (obstructive sleep apnea) - Primary     Mr Robin Rodriguez was diagnosed with obstructive sleep apnea and was started on CPAP therapy  States that he has been using the CPAP regularly though he is not all the time comfortable with the mask and pressure  He does not have any significant daytime sleepiness or morning headache  His CPAP compliance records are not available currently  He is very motivated to continue on CPAP therapy  He may need a re-evaluation of his CPAP pressure later  I had a long discussion with him and answered all his questions  I have advised him to continue as before  [facility] is his DME  Asbestos-induced pleural plaque     He was previously found to have asbestos related pleural plaque  He worked in [employer] and there is history of asbestos exposure  He also had some lung nodules  These need to be followed up  Other    Pulmonary nodule less than 6 mm in diameter with low risk for malignant neoplasm     He had a 3 mm right upper lobe lung nodule on his previous CT scan he is a previous smoker  We will do a follow-up CT scan later  Recent Visits  No visits were found meeting these conditions  Showing recent visits within past 7 days and meeting all other requirements  Today's Visits  Date Type Provider Dept   01/04/22 Telemedicine Isha Reynaga MD [facility] today's visits and meeting all other requirements  Future Appointments  No visits were found meeting these conditions  Showing future appointments within next 150 days and meeting all other requirements     History of present illness:  I conducted a tele health audio visit for Mr Robin Rodriguez with obstructive sleep apnea, pleural plaques and lung nodules    The patient was at his home in [place] and the visit was conducted from my office  The patient was aware that this is a billable service  The patient privacy precautions were taken  A total of 11 minutes of time was spent during the visit  Patient denies any shortness of breath or cough or phlegm  He has been using the CPAP though not regularly  He is still not feeling comfortable with the mask or pressure  He stated that the mask occasionally disturbs him and he does not use it  He denied any significant daytime sleepiness or morning headache  He wants to continue CPAP therapy  His CPAP machine is more than 8years old  He has not had any recent titration study  He has hypertension as comorbidity  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  Currently does not have any weight loss or anorexia or hemoptysis  He was also found to have multiple subcentimeter lung nodules which were stable on the last CT scan  He has not had any recent CT scan  He has COVID vaccinated and is taking all COVID precautions  Review of systems:  More than 12 systems reviewed     Patient denies any fever or chills  No chest pain or palpitations  No significant shortness of breath or cough or phlegm or wheezing chest pain  No swelling of feet  Appetite good  No weight loss  No nausea vomiting diarrhea or constipation  No problem holding passing urine  He has occasional urgency with urine  No hematuria no dizziness or headache  No daytime sleepiness or tiredness  No joint pain or swelling  No allergies  Has history of hypertension and has been on treatment with amlodipine and atenolol  His sleep is disturbed    He is not using the CPAP regularly    I spent 11 minutes directly with the patient during this visit

## 2022-01-04 NOTE — ASSESSMENT & PLAN NOTE
He was previously found to have asbestos related pleural plaque  He worked in [employer] and there is history of asbestos exposure  He also had some lung nodules  These need to be followed up

## 2022-01-04 NOTE — ASSESSMENT & PLAN NOTE
Mr Dyllan Rock was diagnosed with obstructive sleep apnea and was started on CPAP therapy  States that he has been using the CPAP regularly though he is not all the time comfortable with the mask and pressure  He does not have any significant daytime sleepiness or morning headache  His CPAP compliance records are not available currently  He is very motivated to continue on CPAP therapy  He may need a re-evaluation of his CPAP pressure later  I had a long discussion with him and answered all his questions  I have advised him to continue as before  [facility] is his DME

## 2022-01-04 NOTE — ASSESSMENT & PLAN NOTE
He had a 3 mm right upper lobe lung nodule on his previous CT scan he is a previous smoker  We will do a follow-up CT scan later

## 2022-03-10 ENCOUNTER — TELEPHONE (OUTPATIENT)
Dept: NEPHROLOGY | Facility: CLINIC | Age: 87
End: 2022-03-10

## 2022-03-10 NOTE — TELEPHONE ENCOUNTER
Called to schedule April f/u appointment with Dr Abhijit Goddard and then call right back to reschedule as the time interfered with an appointment his wife had

## 2022-03-30 ENCOUNTER — APPOINTMENT (OUTPATIENT)
Dept: LAB | Facility: CLINIC | Age: 87
End: 2022-03-30
Payer: MEDICARE

## 2022-03-30 DIAGNOSIS — N18.9 CHRONIC KIDNEY DISEASE, UNSPECIFIED CKD STAGE: ICD-10-CM

## 2022-03-30 LAB
ANION GAP SERPL CALCULATED.3IONS-SCNC: 4 MMOL/L (ref 4–13)
ARTIFACT: PRESENT
BASOPHILS # BLD MANUAL: 0.38 THOUSAND/UL (ref 0–0.1)
BASOPHILS NFR MAR MANUAL: 3 % (ref 0–1)
BUN SERPL-MCNC: 50 MG/DL (ref 5–25)
CALCIUM SERPL-MCNC: 9.1 MG/DL (ref 8.3–10.1)
CHLORIDE SERPL-SCNC: 112 MMOL/L (ref 100–108)
CO2 SERPL-SCNC: 25 MMOL/L (ref 21–32)
CREAT SERPL-MCNC: 4.12 MG/DL (ref 0.6–1.3)
EOSINOPHIL # BLD MANUAL: 0.25 THOUSAND/UL (ref 0–0.4)
EOSINOPHIL NFR BLD MANUAL: 2 % (ref 0–6)
ERYTHROCYTE [DISTWIDTH] IN BLOOD BY AUTOMATED COUNT: 14.3 % (ref 11.6–15.1)
GFR SERPL CREATININE-BSD FRML MDRD: 11 ML/MIN/1.73SQ M
GLUCOSE P FAST SERPL-MCNC: 94 MG/DL (ref 65–99)
HCT VFR BLD AUTO: 39.8 % (ref 36.5–49.3)
HGB BLD-MCNC: 12 G/DL (ref 12–17)
LYMPHOCYTES # BLD AUTO: 0.63 THOUSAND/UL (ref 0.6–4.47)
LYMPHOCYTES # BLD AUTO: 5 % (ref 14–44)
MCH RBC QN AUTO: 29.3 PG (ref 26.8–34.3)
MCHC RBC AUTO-ENTMCNC: 30.2 G/DL (ref 31.4–37.4)
MCV RBC AUTO: 97 FL (ref 82–98)
MONOCYTES # BLD AUTO: 3.26 THOUSAND/UL (ref 0–1.22)
MONOCYTES NFR BLD: 26 % (ref 4–12)
NEUTROPHILS # BLD MANUAL: 6.02 THOUSAND/UL (ref 1.85–7.62)
NEUTS SEG NFR BLD AUTO: 48 % (ref 43–75)
PLATELET # BLD AUTO: 60 THOUSANDS/UL (ref 149–390)
PLATELET BLD QL SMEAR: ABNORMAL
POTASSIUM SERPL-SCNC: 5.4 MMOL/L (ref 3.5–5.3)
RBC # BLD AUTO: 4.09 MILLION/UL (ref 3.88–5.62)
RBC MORPH BLD: NORMAL
SODIUM SERPL-SCNC: 141 MMOL/L (ref 136–145)
VARIANT LYMPHS # BLD AUTO: 16 %
WBC # BLD AUTO: 12.55 THOUSAND/UL (ref 4.31–10.16)

## 2022-03-30 PROCEDURE — 85027 COMPLETE CBC AUTOMATED: CPT

## 2022-03-30 PROCEDURE — 85007 BL SMEAR W/DIFF WBC COUNT: CPT

## 2022-03-30 PROCEDURE — 36415 COLL VENOUS BLD VENIPUNCTURE: CPT

## 2022-03-30 PROCEDURE — 80048 BASIC METABOLIC PNL TOTAL CA: CPT

## 2022-03-31 ENCOUNTER — TELEPHONE (OUTPATIENT)
Dept: NEPHROLOGY | Facility: CLINIC | Age: 87
End: 2022-03-31

## 2022-03-31 DIAGNOSIS — N18.9 CHRONIC KIDNEY DISEASE, UNSPECIFIED CKD STAGE: Primary | ICD-10-CM

## 2022-03-31 NOTE — TELEPHONE ENCOUNTER
Lm for the patient to call back to go over questions Dr Larry has, Any new med changes, difficulty urinating ect

## 2022-03-31 NOTE — TELEPHONE ENCOUNTER
----- Message from Dinesh Sexton MD sent at 3/30/2022  1:35 PM EDT -----  Call patient check going to make sure that there were no new medications also make sure he is urinating well without any difficulty in feel that he is emptying his bladder  Told his creatinine was a little bit higher  If all the other stuff is the same and he is urinating with no difficulty have him repeat the BMP next week    Thank you   ----- Message -----  From: Lab, Background User  Sent: 3/30/2022  12:47 PM EDT  To: Dinesh Sexton MD

## 2022-04-01 NOTE — TELEPHONE ENCOUNTER
I spoke to the patient he advised no changes are to be made at this time given he has had no medication changes nor issues with urinating, he actually goes more than he was previously  Labs due next week

## 2022-04-06 ENCOUNTER — HOSPITAL ENCOUNTER (EMERGENCY)
Facility: HOSPITAL | Age: 87
Discharge: HOME | End: 2022-04-06
Attending: EMERGENCY MEDICINE | Admitting: EMERGENCY MEDICINE
Payer: MEDICARE

## 2022-04-06 ENCOUNTER — APPOINTMENT (EMERGENCY)
Dept: CT IMAGING | Facility: HOSPITAL | Age: 87
End: 2022-04-06
Payer: MEDICARE

## 2022-04-06 VITALS
TEMPERATURE: 98.1 F | RESPIRATION RATE: 16 BRPM | OXYGEN SATURATION: 100 % | SYSTOLIC BLOOD PRESSURE: 177 MMHG | HEIGHT: 67 IN | DIASTOLIC BLOOD PRESSURE: 75 MMHG | HEART RATE: 68 BPM | BODY MASS INDEX: 17.3 KG/M2 | WEIGHT: 110.23 LBS

## 2022-04-06 DIAGNOSIS — S51.812A FOREARM LACERATION, LEFT, INITIAL ENCOUNTER: Primary | ICD-10-CM
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
## 2022-04-06 PROCEDURE — 12002 RPR S/N/AX/GEN/TRNK2.6-7.5CM: CPT | Performed by: EMERGENCY MEDICINE

## 2022-04-06 PROCEDURE — 99283 EMERGENCY DEPT VISIT LOW MDM: CPT

## 2022-04-06 PROCEDURE — G1004 CDSM NDSC: HCPCS

## 2022-04-06 PROCEDURE — 99282 EMERGENCY DEPT VISIT SF MDM: CPT | Performed by: EMERGENCY MEDICINE

## 2022-04-06 PROCEDURE — 70450 CT HEAD/BRAIN W/O DYE: CPT

## 2022-04-06 RX ORDER — LIDOCAINE HYDROCHLORIDE AND EPINEPHRINE 10; 10 MG/ML; UG/ML
5 INJECTION, SOLUTION INFILTRATION; PERINEURAL ONCE
Status: COMPLETED | OUTPATIENT
Start: 2022-04-06 | End: 2022-04-06

## 2022-04-06 RX ORDER — GINSENG 100 MG
1 CAPSULE ORAL 2 TIMES DAILY
Qty: 28 G | Refills: 0 | Status: SHIPPED | OUTPATIENT
Start: 2022-04-06

## 2022-04-06 RX ORDER — GINSENG 100 MG
1 CAPSULE ORAL ONCE
Status: COMPLETED | OUTPATIENT
Start: 2022-04-06 | End: 2022-04-06

## 2022-04-06 RX ADMIN — LIDOCAINE HYDROCHLORIDE AND EPINEPHRINE 5 ML: 10; 10 INJECTION, SOLUTION INFILTRATION; PERINEURAL at 08:58

## 2022-04-06 RX ADMIN — BACITRACIN ZINC 1 SMALL APPLICATION: 500 OINTMENT TOPICAL at 09:00

## 2022-04-06 NOTE — DISCHARGE INSTRUCTIONS
Keep area clean, use bacitracin on wound for next two days, return for suture removal in 7-10 days or see your primary care doctor for suture removal   Return to the ER for any of the above symptoms  We are always here and always happy to re-evaluate!

## 2022-04-06 NOTE — ED ATTENDING ATTESTATION
4/6/2022  IRe MD, saw and evaluated the patient  I have discussed the patient with the resident/non-physician practitioner and agree with the resident's/non-physician practitioner's findings, Plan of Care, and MDM as documented in the resident's/non-physician practitioner's note, except where noted  All available labs and Radiology studies were reviewed  I was present for key portions of any procedure(s) performed by the resident/non-physician practitioner and I was immediately available to provide assistance  At this point I agree with the current assessment done in the Emergency Department  I have conducted an independent evaluation of this patient a history and physical is as follows:    79 yo male with h/o CKD, emphysema, HTN p/w left forearm laceration after trip and mechanical fall while getting out of bed  No prodromal symptoms, pt denies head strike, loc, seizure activity, head/neck/back/belly pain  No other associated injury  On exam pt well appearing, Neuro intact  Large irregular laceration noted to dorsal forearm  Two superficial avulsions noted to right dorsal forearm  Benign shoulders, wrists, hands throughout  Pt NV intact distal to injury  Laceration irrigated, and repaired in ED  Tetanus up to date per chart  Head CT negative  No blood thinners  Pt safe for dispo to home  RTER precautions discussed and documented on discharge paperwork, pt and family endorsed good understanding of reasons to return  I was present throughout laceration repair          Past Medical History:   Diagnosis Date    Asbestos-induced pleural plaque     Chronic kidney disease     Chronic renal failure     Colon polyp     Congenital absence of one kidney     CPAP (continuous positive airway pressure) dependence     Heart murmur     High blood pressure     History of Doppler echocardiogram 2011    History of radiation therapy     History of stress test     1/2017, 7/2014, 10/2012, 3/24/2011    Hypertension     Left bundle branch block     Observed sleep apnea     Prostate cancer ([facility] [address] )     Pulmonary emphysema [facility])          ED Course  ED Course as of 04/06/22 1434   Wed Apr 06, 2022   0802 CT head without contrast  IMPRESSION:     No acute intracranial abnormality       0837 Tdap given 7/2019         Critical Care Time  Procedures

## 2022-04-07 NOTE — ED PROVIDER NOTES
History  Chief Complaint   Patient presents with    Extremity Laceration     pt states he was trying to answer his phone when he then fell out of bed  pt hit left arm on night stand, laceration noted  pt denies head strike  no thinners  80year old male with PMHx of HTN, CKD and emphysema presents today immediately following mechanical fall  Patient states he was startled awake when the phone rang, causing him to fall out of bed and strike his bilateral arms on the bedside table  Patient denies loss of consciousness, head strike or anticoagulation or antiplatelet agent use  Patient sustained lacerations to bilateral forearms but denies any additional injuries or complaints at this time  Prior to Admission Medications   Prescriptions Last Dose Informant Patient Reported? Taking?    Probiotic Product (PROBIOTIC-10 PO)  Self Yes No   Sig: Take 1 tablet by mouth daily   amLODIPine (NORVASC) 2 5 mg tablet  Self Yes No   Sig: Take 5 mg by mouth daily    atenolol (TENORMIN) 50 mg tablet  Self Yes No   Sig: Take 50 mg by mouth daily   atorvastatin (LIPITOR) 10 mg tablet  Self Yes No   Sig: Take 10 mg by mouth daily   b complex vitamins tablet  Self Yes No   Sig: Take 1 tablet by mouth daily   cholecalciferol (VITAMIN D3) 1,000 units tablet  Self Yes No   Sig: Take 1,000 Units by mouth daily   famotidine (PEPCID) 20 mg tablet  Self No No   Sig: Take 1 tablet (20 mg total) by mouth 2 (two) times a day   folic acid (FOLVITE) 1 mg tablet  Self Yes No   Sig: Take 800 mcg by mouth daily    pantoprazole (PROTONIX) 40 mg tablet   No No   Sig: TAKE 1 TABLET(40 MG) BY MOUTH DAILY   Patient not taking: Reported on 1/4/2022      Facility-Administered Medications: None       Past Medical History:   Diagnosis Date    Asbestos-induced pleural plaque     Chronic kidney disease     Chronic renal failure     Colon polyp     Congenital absence of one kidney     CPAP (continuous positive airway pressure) dependence     Heart murmur     High blood pressure     History of Doppler echocardiogram     History of radiation therapy     History of stress test     2017, 2014, 10/2012, 3/24/2011    Hypertension     Left bundle branch block     Observed sleep apnea     Pro[facility] ([facility] [address] [facility]     Pulmonary emphysema ([facility] [address] )        Past Surgical History:   Procedure Laterality Date    BLADDER SURGERY  2018    CARDIAC CATHETERIZATION  2011    CATARACT EXTRACTION      CHOLECYSTECTOMY      COLONOSCOPY      HERNIA REPAIR      KNEE ARTHROSCOPY  2011    PROSTATE SURGERY      status post radiation for prostate cancer       Family History   Problem Relation Age of Onset    Heart disease Mother     Stroke Mother     Colon cancer Father     Asthma Sister     Coronary artery disease Family     Other Neg Hx         COVID wife     I have reviewed and agree with the history as documented  E-Cigarette/Vaping    E-Cigarette Use Never User      E-Cigarette/Vaping Substances     Social History     Tobacco Use    Smoking status: Former Smoker     Packs/day: 1      Years: 30      Pack years: 30      Types: Cigarettes     Quit date: 1978     Years since quittin 1    Smokeless tobacco: Never Used    Tobacco comment: quit  - As per [employer] Vaping Use: Never used   Substance Use Topics    Alcohol use: Yes     Comment: socially     Drug use: No        Review of Systems   Constitutional: Negative for chills and fever  HENT: Negative for ear pain and sore throat  Eyes: Negative for pain and visual disturbance  Respiratory: Negative for cough and shortness of breath  Cardiovascular: Negative for chest pain and palpitations  Gastrointestinal: Negative for abdominal pain and vomiting  Genitourinary: Negative for dysuria and hematuria  Musculoskeletal: Negative for arthralgias and back pain  Skin: Positive for wound  Negative for color change and rash  Neurological: Negative for seizures and syncope  All other systems reviewed and are negative  Physical Exam  ED Triage Vitals   Temperature Pulse Respirations Blood Pressure SpO2   04/06/22 0626 04/06/22 0622 04/06/22 0622 04/06/22 0622 04/06/22 0622   98 1 °F (36 7 °C) 68 16 (!) 177/75 100 %      Temp Source Heart Rate Source Patient Position - Orthostatic VS BP Location FiO2 (%)   04/06/22 0626 04/06/22 0622 04/06/22 0622 04/06/22 0622 --   Oral Monitor Lying Right arm       Pain Score       04/06/22 0622       3             Orthostatic Vital Signs  Vitals:    04/06/22 0622   BP: (!) 177/75   Pulse: 68   Patient Position - Orthostatic VS: Lying       Physical Exam  Vitals and nursing note reviewed  Constitutional:       General: He is not in acute distress  Appearance: Normal appearance  He is well-developed  HENT:      Head: Normocephalic and atraumatic  Eyes:      Conjunctiva/sclera: Conjunctivae normal       Pupils: Pupils are equal, round, and reactive to light  Cardiovascular:      Rate and Rhythm: Normal rate and regular rhythm  Heart sounds: No murmur heard  Pulmonary:      Effort: Pulmonary effort is normal  No respiratory distress  Breath sounds: Normal breath sounds  Abdominal:      Palpations: Abdomen is soft  Tenderness: There is no abdominal tenderness  Musculoskeletal:      Cervical back: Neck supple  Skin:     General: Skin is warm and dry  Capillary Refill: Capillary refill takes less than 2 seconds  Comments: Large, jagged and irregular approximately 5 cm laceration to dorsum of left forearm overlying mid ulnar area with overlying avulsed skin; with 2 small skin avulsions on the dorsal aspect of the right forearm; neurovascularly intact; patient with 2+ radial pulses bilaterally,  strength 5/5 and sensation to light touch at distal upper extremities intact    Neurological:      Mental Status: He is alert           ED Medications  Medications   lidocaine-epinephrine (XYLOCAINE/EPINEPHRINE) 1 %-1:100,000 injection 5 mL (5 mL Infiltration Given by Other 4/6/22 0858)   bacitracin topical ointment 1 small application (1 small application Topical Given 4/6/22 0900)       Diagnostic Studies  Results Reviewed     None                 CT head without contrast   Final Result by Booker Faulkner MD (04/06 0937)      No acute intracranial abnormality  Workstation performed: [id number]               Procedures  Laceration repair    Date/Time: 4/6/2022 7:30 AM  Performed by: Karis Carson MD  Authorized by: Karis Carson MD   Consent: Verbal consent obtained  Risks and benefits: risks, benefits and alternatives were discussed  Consent given by: patient  Patient understanding: patient states understanding of the procedure being performed  Patient identity confirmed: verbally with patient and arm band  Time out: Immediately prior to procedure a "time out" was called to verify the correct patient, procedure, equipment, support staff and site/side marked as required  Body area: upper extremity  Location details: left lower arm  Laceration length: 5 cm  Foreign bodies: no foreign bodies  Anesthesia: local infiltration    Anesthesia:  Local Anesthetic: lidocaine 1% with epinephrine  Anesthetic total: 5 mL    Wound Dehiscence:  Superficial Wound Dehiscence: simple closure      Procedure Details:  Irrigation solution: saline  Irrigation method: syringe  Amount of cleaning: extensive  Skin closure: 5-0 Prolene  Number of sutures: 13  Technique: simple  Dressing: antibiotic ointment and 4x4 sterile gauze  Patient tolerance: patient tolerated the procedure well with no immediate complications            ED Course                             SBIRT 22yo+      Most Recent Value   SBIRT (24 yo +)    In order to provide better care to our patients, we are screening all of our patients for alcohol and drug use   Would it be okay to ask you these screening questions? Yes Filed at: 04/06/2022 0640   Initial Alcohol Screen: US AUDIT-C     1  How often do you have a drink containing alcohol? 0 Filed at: 04/06/2022 0640   2  How many drinks containing alcohol do you have on a typical day you are drinking? 0 Filed at: 04/06/2022 0640   3a  Male UNDER 65: How often do you have five or more drinks on one occasion? 0 Filed at: 04/06/2022 0640   3b  FEMALE Any Age, or MALE 65+: How often do you have 4 or more drinks on one occassion? 0 Filed at: 04/06/2022 0640   Audit-C Score 0 Filed at: 04/06/2022 2334   NAVJOT: How many times in the past year have you    Used an illegal drug or used a prescription medication for non-medical reasons? Never Filed at: 04/06/2022 9163                MDM  Number of Diagnoses or Management Options  Fall, initial encounter  Forearm laceration, left, initial encounter  Diagnosis management comments: 80year old male with PMHx of HTN, CKD and emphysema presents today immediately following mechanical fall, sustaining lacerations to bilateral distal forearms  CT head obtained in the setting of unwitnessed fall in elderly patient, which was negative  Laceration to left forearm repaired with sutures and skin avulsions to right forearm had steri-strips applied  Bacitracin applied  Discussed with patient suture removal in 7-10 days and return to ED sooner for any signs of infection  Patient expressed understanding          Amount and/or Complexity of Data Reviewed  Tests in the radiology section of CPT®: ordered and reviewed    Risk of Complications, Morbidity, and/or Mortality  Presenting problems: moderate  Diagnostic procedures: moderate  Management options: low    Patient Progress  Patient progress: stable      Disposition  Final diagnoses:   Forearm laceration, left, initial encounter   Fall, initial encounter     Time reflects when diagnosis was documented in both MDM as applicable and the Disposition within this note     Time User Action Codes Description Comment    4/6/2022  8:02 AM Hillary Arias [[id number]] Forearm laceration, left, initial encounter     4/6/2022  8:02 AM Tammy Arias Add [[id number]  Ian Mtahias, initial encounter       ED Disposition     ED Disposition Condition Date/Time Comment    Discharge Stable Wed Apr 6, 2022  8:02 AM Lucie Bradleylázaro discharge to home/self care              Follow-up Information     Follow up With Specialties Details Why Contact Info Additional [address] [facility], DO Family Medicine Schedule an appointment as soon as possible for a visit   [address] [facility] Emergency Department Emergency Medicine In 1 week For suture removal, return sooner if severe headache, confusion, changes in vision/motor/speech, redness, swelling, pus like discharge from wound, uncontrolled pain or any other symptoms [address] Emergency Department, [address], [place], [place], [address]          Discharge Medication List as of 4/6/2022  8:05 AM      START taking these medications    Details   bacitracin topical ointment 500 units/g topical ointment Apply 1 large application topically 2 (two) times a day, Starting Wed 4/6/2022, Normal         CONTINUE these medications which have NOT CHANGED    Details   amLODIPine (NORVASC) 2 5 mg tablet Take 5 mg by mouth daily , Historical Med      atenolol (TENORMIN) 50 mg tablet Take 50 mg by mouth daily, Historical Med      atorvastatin (LIPITOR) 10 mg tablet Take 10 mg by mouth daily, Historical Med      b complex vitamins tablet Take 1 tablet by mouth daily, Historical Med      cholecalciferol (VITAMIN D3) 1,000 units tablet Take 1,000 Units by mouth daily, Historical Med      famotidine (PEPCID) 20 mg tablet Take 1 tablet (20 mg total) by mouth 2 (two) times a day, Starting Thu 5/28/2020, No Print      folic acid (FOLVITE) 1 mg tablet Take 800 mcg by mouth daily , Historical Med      pantoprazole (PROTONIX) 40 mg tablet TAKE 1 TABLET(40 MG) BY MOUTH DAILY, Normal      Probiotic Product (PROBIOTIC-10 PO) Take 1 tablet by mouth daily, Historical Med           No discharge procedures on file  PDMP Review     None           ED Provider  Attending physically available and evaluated Sandip Villalobos I managed the patient along with the ED Attending      Electronically Signed by         Jonah Villeda MD  04/06/22 6892

## 2022-04-11 ENCOUNTER — TELEPHONE (OUTPATIENT)
Dept: NEPHROLOGY | Facility: CLINIC | Age: 87
End: 2022-04-11

## 2022-04-11 ENCOUNTER — APPOINTMENT (OUTPATIENT)
Dept: LAB | Facility: CLINIC | Age: 87
End: 2022-04-11
Payer: MEDICARE

## 2022-04-11 DIAGNOSIS — N18.9 CHRONIC KIDNEY DISEASE, UNSPECIFIED CKD STAGE: ICD-10-CM

## 2022-04-11 LAB
ANION GAP SERPL CALCULATED.3IONS-SCNC: 5 MMOL/L (ref 4–13)
BUN SERPL-MCNC: 54 MG/DL (ref 5–25)
CALCIUM SERPL-MCNC: 8.9 MG/DL (ref 8.3–10.1)
CHLORIDE SERPL-SCNC: 113 MMOL/L (ref 100–108)
CO2 SERPL-SCNC: 26 MMOL/L (ref 21–32)
CREAT SERPL-MCNC: 3.46 MG/DL (ref 0.6–1.3)
GFR SERPL CREATININE-BSD FRML MDRD: 14 ML/MIN/1.73SQ M
GLUCOSE P FAST SERPL-MCNC: 86 MG/DL (ref 65–99)
POTASSIUM SERPL-SCNC: 5.4 MMOL/L (ref 3.5–5.3)
SODIUM SERPL-SCNC: 144 MMOL/L (ref 136–145)

## 2022-04-11 PROCEDURE — 36415 COLL VENOUS BLD VENIPUNCTURE: CPT

## 2022-04-11 PROCEDURE — 80048 BASIC METABOLIC PNL TOTAL CA: CPT

## 2022-04-11 NOTE — TELEPHONE ENCOUNTER
----- Message from Brad Conley MD sent at 4/11/2022  2:33 PM EDT -----  Let him know creatinine better at 3 6  ----- Message -----  From: Lab, Background User  Sent: 4/11/2022   1:11 PM EDT  To: Brad Conley MD

## 2022-05-10 ENCOUNTER — HOSPITAL ENCOUNTER (EMERGENCY)
Facility: HOSPITAL | Age: 87
Discharge: HOME | End: 2022-05-10
Attending: EMERGENCY MEDICINE
Payer: MEDICARE

## 2022-05-10 VITALS
HEART RATE: 96 BPM | SYSTOLIC BLOOD PRESSURE: 189 MMHG | TEMPERATURE: 97.9 F | DIASTOLIC BLOOD PRESSURE: 74 MMHG | OXYGEN SATURATION: 98 % | RESPIRATION RATE: 18 BRPM

## 2022-05-10 DIAGNOSIS — S81.812A SKIN TEAR OF LOWER LEG WITHOUT COMPLICATION, LEFT, INITIAL ENCOUNTER: Primary | ICD-10-CM

## 2022-05-10 PROCEDURE — 12002 RPR S/N/AX/GEN/TRNK2.6-7.5CM: CPT | Performed by: EMERGENCY MEDICINE

## 2022-05-10 PROCEDURE — 99284 EMERGENCY DEPT VISIT MOD MDM: CPT | Performed by: EMERGENCY MEDICINE

## 2022-05-10 PROCEDURE — 99282 EMERGENCY DEPT VISIT SF MDM: CPT

## 2022-05-11 NOTE — ED PROVIDER NOTES
History  Chief Complaint   Patient presents with    Skin Problem     pt hit leg on dinner on table, small skin tear on L leg, L arm, pt state bleeding was uncontrolled PTA, denies thinners or headstrike       History provided by:  Patient   used: No    79-year-old male presented for evaluation of left lower leg injury after striking it on a table at home this evening  No anticoagulation or aspirin  Denies any other injuries  On exam he has multiple small skin tears, totaling about 4 cm  Minimal bleeding controlled at this time  Has some mild localized pain  No tenderness of surrounding area  Able to walk without difficulty  Plan cleansing and closure  Prior to Admission Medications   Prescriptions Last Dose Informant Patient Reported? Taking?    Probiotic Product (PROBIOTIC-10 PO)  Self Yes No   Sig: Take 1 tablet by mouth daily   atenolol (TENORMIN) 50 mg tablet  Self Yes No   Sig: Take 50 mg by mouth daily   atorvastatin (LIPITOR) 10 mg tablet  Self Yes No   Sig: Take 10 mg by mouth daily   b complex vitamins tablet  Self Yes No   Sig: Take 1 tablet by mouth daily   bacitracin topical ointment 500 units/g topical ointment   No No   Sig: Apply 1 large application topically 2 (two) times a day   cholecalciferol (VITAMIN D3) 1,000 units tablet  Self Yes No   Sig: Take 1,000 Units by mouth daily   famotidine (PEPCID) 20 mg tablet  Self No No   Sig: Take 1 tablet (20 mg total) by mouth 2 (two) times a day   folic acid (FOLVITE) 1 mg tablet  Self Yes No   Sig: Take 800 mcg by mouth daily    pantoprazole (PROTONIX) 40 mg tablet   No No   Sig: TAKE 1 TABLET(40 MG) BY MOUTH DAILY   Patient not taking: No sig reported      Facility-Administered Medications: None       Past Medical History:   Diagnosis Date    Asbestos-induced pleural plaque     Chronic kidney disease     Chronic renal failure     Colon polyp     Congenital absence of one kidney     CPAP (continuous positive airway pressure) dependence     Heart murmur     High blood pressure     History of Doppler echocardiogram     History of radiation therapy     History of stress test     2017, 2014, 10/2012, 3/24/2011    Hypertension     Left bundle branch block     Observed sleep apnea     [facility] [address] [facility]     Pulmonary emphysema ([facility] [address] [facility]        Past Surgical History:   Procedure Laterality Date    BLADDER SURGERY  2018    CARDIAC CATHETERIZATION  2011    CATARACT EXTRACTION      CHOLECYSTECTOMY      COLONOSCOPY      HERNIA REPAIR      KNEE ARTHROSCOPY  2011    PROSTATE SURGERY      status post radiation for prostate cancer       Family History   Problem Relation Age of Onset    Heart disease Mother     Stroke Mother     Colon cancer Father     Asthma Sister     Coronary artery disease Family     Other Neg Hx         COVID wife     I have reviewed and agree with the history as documented  E-Cigarette/Vaping    E-Cigarette Use Never User      E-Cigarette/Vaping Substances     Social History     Tobacco Use    Smoking status: Former Smoker     Packs/day: 1      Years: 30      Pack years: 30      Types: Cigarettes     Quit date: 1978     Years since quittin 2    Smokeless tobacco: Never Used    Tobacco comment: quit  - As per [facility] Vaping Use: Never used   Substance Use Topics    Alcohol use: Yes     Comment: socially     Drug use: No       Review of Systems   Constitutional: Negative for activity change, appetite change and fatigue  Respiratory: Negative for chest tightness and shortness of breath  Musculoskeletal: Negative for back pain and neck pain  Skin: Positive for wound  Negative for color change  Neurological: Negative for dizziness, weakness and headaches  All other systems reviewed and are negative  Physical Exam  Physical Exam  Vitals and nursing note reviewed  Constitutional:       Appearance: Normal appearance  HENT:      Head: Normocephalic and atraumatic  Cardiovascular:      Rate and Rhythm: Normal rate and regular rhythm  Pulmonary:      Effort: Pulmonary effort is normal  No respiratory distress  Abdominal:      General: There is no distension  Palpations: Abdomen is soft  Musculoskeletal:         General: No swelling  Normal range of motion  Cervical back: Normal range of motion and neck supple  Skin:     General: Skin is warm and dry  Comments: Two skin tears on the left shin totaling about 4 cm  Neurological:      General: No focal deficit present  Mental Status: He is alert and oriented to person, place, and time  Psychiatric:         Mood and Affect: Mood normal          Behavior: Behavior normal          Vital Signs  ED Triage Vitals [05/10/22 2112]   Temperature Pulse Respirations Blood Pressure SpO2   97 9 °F (36 6 °C) 96 18 (!) 189/74 98 %      Temp Source Heart Rate Source Patient Position - Orthostatic VS BP Location FiO2 (%)   Oral Monitor Sitting Left arm --      Pain Score       --           Vitals:    05/10/22 2112   BP: (!) 189/74   Pulse: 96   Patient Position - Orthostatic VS: Sitting         Visual Acuity      ED Medications  Medications - No data to display    Diagnostic Studies  Results Reviewed     None                 No orders to display              Procedures  Laceration repair    Date/Time: 5/10/2022 10:30 PM  Performed by: Linnette Lopez MD  Authorized by: Linnette Lopez MD   Consent: Verbal consent obtained  Consent given by: patient  Patient identity confirmed: verbally with patient  Body area: lower extremity  Location details: left lower leg  Wound length (cm): Two skin tears totaling 4 cm  Anesthesia method: None      Sedation:  Patient sedated: no      Wound Dehiscence:  Superficial Wound Dehiscence: simple closure      Procedure Details:  Irrigation solution: saline  Skin closure: Steri-Strips  Approximation: close  Approximation difficulty: simple  Dressing: non-adhesive packing strip and gauze roll  Patient tolerance: patient tolerated the procedure well with no immediate complications               ED Course                                             MDM  Number of Diagnoses or Management Options  Skin tear of lower leg without complication, left, initial encounter: new and requires workup  Diagnosis management comments: 51-year-old male with skin tear of the left shin after walking into a table at home  Wound cleansed, closed with Steri-Strips  Stable for discharge  Patient Progress  Patient progress: improved      Disposition  Final diagnoses:   Skin tear of lower leg without complication, left, initial encounter     Time reflects when diagnosis was documented in both MDM as applicable and the Disposition within this note     Time User Action Codes Description Comment    5/10/2022 10:32 PM Noemi Jay Add [N10 [id number]] Skin tear of lower leg without complication, left, initial encounter       ED Disposition     ED Disposition   Discharge    Condition   Stable    Date/Time   Tue May 10, 2022 10:32 PM    Comment   Harleen Bender discharge to home/self care                 Follow-up Information     Follow up With Specialties Details Why Contact Info Additional [address] Emergency Department Emergency Medicine  If symptoms worsen [address] Emergency Department, [address], [place], [address] [facility], [address]          Discharge Medication List as of 5/10/2022 10:33 PM      CONTINUE these medications which have NOT CHANGED    Details   atenolol (TENORMIN) 50 mg tablet Take 50 mg by mouth daily, Historical Med      atorvastatin (LIPITOR) 10 mg tablet Take 10 mg by mouth daily, Historical Med      b complex vitamins tablet Take 1 tablet by mouth daily, Historical Med      bacitracin topical ointment 500 units/g topical ointment Apply 1 large application topically 2 (two) times a day, Starting Wed 4/6/2022, Normal      cholecalciferol (VITAMIN D3) 1,000 units tablet Take 1,000 Units by mouth daily, Historical Med      famotidine (PEPCID) 20 mg tablet Take 1 tablet (20 mg total) by mouth 2 (two) times a day, Starting Thu 5/28/2020, No Print      folic acid (FOLVITE) 1 mg tablet Take 800 mcg by mouth daily , Historical Med      pantoprazole (PROTONIX) 40 mg tablet TAKE 1 TABLET(40 MG) BY MOUTH DAILY, Normal      Probiotic Product (PROBIOTIC-10 PO) Take 1 tablet by mouth daily, Historical Med      amLODIPine (NORVASC) 2 5 mg tablet Take 5 mg by mouth daily , Historical Med             No discharge procedures on file      PDMP Review     None          ED Provider  Electronically Signed by           Thaddeus Low MD  05/29/22 6533

## 2022-05-12 ENCOUNTER — OFFICE VISIT (OUTPATIENT)
Dept: NEPHROLOGY | Facility: CLINIC | Age: 87
End: 2022-05-12
Payer: MEDICARE

## 2022-05-12 ENCOUNTER — TELEPHONE (OUTPATIENT)
Dept: NEPHROLOGY | Facility: CLINIC | Age: 87
End: 2022-05-12

## 2022-05-12 VITALS
HEIGHT: 67 IN | WEIGHT: 110 LBS | HEART RATE: 80 BPM | BODY MASS INDEX: 17.27 KG/M2 | SYSTOLIC BLOOD PRESSURE: 154 MMHG | DIASTOLIC BLOOD PRESSURE: 74 MMHG

## 2022-05-12 DIAGNOSIS — I10 PRIMARY HYPERTENSION: Primary | ICD-10-CM

## 2022-05-12 DIAGNOSIS — I12.9 NEPHROSCLEROSIS ARTERIOLAR, STAGE 1-4 OR UNSPECIFIED CHRONIC KIDNEY DISEASE: ICD-10-CM

## 2022-05-12 DIAGNOSIS — Q60.0 SOLITARY KIDNEY, CONGENITAL: ICD-10-CM

## 2022-05-12 DIAGNOSIS — I27.20 PULMONARY HYPERTENSION (HCC): ICD-10-CM

## 2022-05-12 DIAGNOSIS — N18.9 CHRONIC KIDNEY DISEASE, UNSPECIFIED CKD STAGE: ICD-10-CM

## 2022-05-12 PROCEDURE — 99214 OFFICE O/P EST MOD 30 MIN: CPT | Performed by: INTERNAL MEDICINE

## 2022-05-12 RX ORDER — AMLODIPINE BESYLATE 10 MG/1
10 TABLET ORAL DAILY
Qty: 90 TABLET | Refills: 3 | Status: SHIPPED | OUTPATIENT
Start: 2022-05-12

## 2022-05-12 NOTE — PROGRESS NOTES
NEPHROLOGY PROGRESS NOTE    Rush Torres 80 y o  male MRN: [id number]  Unit/Bed#:  Encounter: [id number]  Reason for Consult:  Chronic kidney disease is solitary kidney    Patient is here for routine follow-up  States he has been doing well  He feels he is emptying his bladder completely but does urinate a lot at night but that is unchanged  Other than that no hospitalizations or changes in medications reported  ASSESSMENT/PLAN:  1  Renal    Patient has a congenital solitary kidney he had developed proteinuria and chronic kidney disease there may be an underlying element of some focal sclerosis  Over the years he 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 and then due to hyper filtration injury potentially FSGS and progressive kidney decline  His creatinine is 3 4 which is roughly stable at his baseline over the last year without significant progression  He has no volume overload electrolytes are acceptable and no symptoms of uremia  Were trying to manage this with blood pressure control  I discussed with him that if it ever came up in the future about needing dialysis would he do it he said he would and he was familiar with this cause his brother had  in the past   I briefly brought up the different modalities of hemodialysis and peritoneal dialysis and he said he was interested in learning about them so I have consulted our CKD education program   I told him he was no closer to dialysis but it was a good idea to get the education and he agreed  Last hemoglobin was 12 so no anemia  Consult CKD education for modality review  Monitor renal function    2  Hypertension    Patient's blood pressures been running high at other doctor's offices as well  I am going to increase his amlodipine to 10 mg a day continue the other medication at the same dose  I told if he develops swelling or any side effects to call me      SUBJECTIVE:  Review of Systems   Constitutional: Negative for chills, decreased appetite, diaphoresis and fever  HENT: Negative  Eyes: Negative  Cardiovascular: Negative  Negative for chest pain, dyspnea on exertion, leg swelling and orthopnea  Respiratory: Negative  Negative for cough, shortness of breath, sputum production and wheezing  Gastrointestinal: Negative for abdominal pain, diarrhea, nausea and vomiting  Genitourinary: Positive for nocturia  Negative for dysuria, flank pain, hematuria and incomplete emptying  Neurological: Negative for dizziness, focal weakness, headaches and weakness  Psychiatric/Behavioral: Negative for altered mental status, depression, hallucinations and hypervigilance  OBJECTIVE:  Current Weight: Weight - Scale: 49 9 kg (110 lb)  [email or web address] com:     Blood pressure 154/74, pulse 80, height 5' 7" (1 702 m), weight 49 9 kg (110 lb)  , Body mass index is 17 23 kg/m²  [unfilled]    Physical Exam: /74 (BP Location: Right arm, Patient Position: Sitting, Cuff Size: Standard)   Pulse 80   Ht 5' 7" (1 702 m)   Wt 49 9 kg (110 lb)   BMI 17 23 kg/m²   Physical Exam  Constitutional:       General: He is not in acute distress  Appearance: He is not toxic-appearing or diaphoretic  HENT:      Head: Normocephalic and atraumatic  Mouth/Throat:      Mouth: Mucous membranes are moist    Eyes:      General: No scleral icterus  Extraocular Movements: Extraocular movements intact  Conjunctiva/sclera: Conjunctivae normal    Cardiovascular:      Rate and Rhythm: Normal rate and regular rhythm  Heart sounds: No friction rub  No gallop  Comments: No significant edema  Pulmonary:      Effort: Pulmonary effort is normal  No respiratory distress  Breath sounds: Normal breath sounds  No wheezing, rhonchi or rales  Abdominal:      General: Bowel sounds are normal  There is no distension  Palpations: Abdomen is soft  Tenderness: There is no abdominal tenderness  There is no rebound     Musculoskeletal: Cervical back: Normal range of motion and neck supple  Neurological:      General: No focal deficit present  Mental Status: He is alert and oriented to person, place, and time  Mental status is at baseline  Psychiatric:         Mood and Affect: Mood normal          Behavior: Behavior normal          Thought Content:  Thought content normal          Judgment: Judgment normal          Medications:    Current Outpatient Medications:     amLODIPine (NORVASC) 10 mg tablet, Take 1 tablet (10 mg total) by mouth in the morning , Disp: 90 tablet, Rfl: 3    atenolol (TENORMIN) 50 mg tablet, Take 50 mg by mouth daily, Disp: , Rfl:     atorvastatin (LIPITOR) 10 mg tablet, Take 10 mg by mouth daily, Disp: , Rfl:     b complex vitamins tablet, Take 1 tablet by mouth daily, Disp: , Rfl:     bacitracin topical ointment 500 units/g topical ointment, Apply 1 large application topically 2 (two) times a day, Disp: 28 g, Rfl: 0    cholecalciferol (VITAMIN D3) 1,000 units tablet, Take 1,000 Units by mouth daily, Disp: , Rfl:     famotidine (PEPCID) 20 mg tablet, Take 1 tablet (20 mg total) by mouth 2 (two) times a day, Disp: 30 tablet, Rfl: 5    folic acid (FOLVITE) 1 mg tablet, Take 800 mcg by mouth daily , Disp: , Rfl:     Probiotic Product (PROBIOTIC-10 PO), Take 1 tablet by mouth daily, Disp: , Rfl:     pantoprazole (PROTONIX) 40 mg tablet, TAKE 1 TABLET(40 MG) BY MOUTH DAILY (Patient not taking: No sig reported), Disp: 90 tablet, Rfl: 1    Laboratory Results:  Lab Results   Component Value Date    WBC 12 55 (H) 03/30/2022    HGB 12 0 03/30/2022    HCT 39 8 03/30/2022    MCV 97 03/30/2022    PLT 60 (L) 03/30/2022     Lab Results   Component Value Date    SODIUM 144 04/11/2022    K 5 4 (H) 04/11/2022     (H) 04/11/2022    CO2 26 04/11/2022    BUN 54 (H) 04/11/2022    CREATININE 3 46 (H) 04/11/2022    GLUC 85 02/01/2020    CALCIUM 8 9 04/11/2022     Lab Results   Component Value Date    CALCIUM 8 9 04/11/2022 PHOS 4 0 04/02/2019     No results found for: LABPROT

## 2022-05-12 NOTE — LETTER
May 12, 2022     Flor Clifford DO  [address]    Patient: Jorge Montague   YOB: 1930   Date of Visit: 2022       Dear Dr Marilou Matos:    Thank you for referring Jorge Montague to me for evaluation  Below are my notes for this consultation  If you have questions, please do not hesitate to call me  I look forward to following your patient along with you  Sincerely,        Rachel Brooks MD        CC: No Recipients  Rachel Brooks MD  2022  4:52 PM  Sign when Signing Visit  NEPHROLOGY PROGRESS NOTE    Jorge Montague 80 y o  male MRN: [id number]  Unit/Bed#:  Encounter: [id number]  Reason for Consult:  Chronic kidney disease is solitary kidney    Patient is here for routine follow-up  States he has been doing well  He feels he is emptying his bladder completely but does urinate a lot at night but that is unchanged  Other than that no hospitalizations or changes in medications reported  ASSESSMENT/PLAN:  1  Renal    Patient has a congenital solitary kidney he had developed proteinuria and chronic kidney disease there may be an underlying element of some focal sclerosis  Over the years he 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 and then due to hyper filtration injury potentially FSGS and progressive kidney decline  His creatinine is 3 4 which is roughly stable at his baseline over the last year without significant progression  He has no volume overload electrolytes are acceptable and no symptoms of uremia  Were trying to manage this with blood pressure control    I discussed with him that if it ever came up in the future about needing dialysis would he do it he said he would and he was familiar with this cause his brother had  in the past   I briefly brought up the different modalities of hemodialysis and peritoneal dialysis and he said he was interested in learning about them so I have consulted our CKD education program   I told him he was no closer to dialysis but it was a good idea to get the education and he agreed  Last hemoglobin was 12 so no anemia  Consult CKD education for modality review  Monitor renal function    2  Hypertension    Patient's blood pressures been running high at other doctor's offices as well  I am going to increase his amlodipine to 10 mg a day continue the other medication at the same dose  I told if he develops swelling or any side effects to call me  SUBJECTIVE:  Review of Systems   Constitutional: Negative for chills, decreased appetite, diaphoresis and fever  HENT: Negative  Eyes: Negative  Cardiovascular: Negative  Negative for chest pain, dyspnea on exertion, leg swelling and orthopnea  Respiratory: Negative  Negative for cough, shortness of breath, sputum production and wheezing  Gastrointestinal: Negative for abdominal pain, diarrhea, nausea and vomiting  Genitourinary: Positive for nocturia  Negative for dysuria, flank pain, hematuria and incomplete emptying  Neurological: Negative for dizziness, focal weakness, headaches and weakness  Psychiatric/Behavioral: Negative for altered mental status, depression, hallucinations and hypervigilance  OBJECTIVE:  Current Weight: Weight - Scale: 49 9 kg (110 lb)  [email or web address]:     Blood pressure 154/74, pulse 80, height 5' 7" (1 702 m), weight 49 9 kg (110 lb)  , Body mass index is 17 23 kg/m²  [unfilled]    Physical Exam: /74 (BP Location: Right arm, Patient Position: Sitting, Cuff Size: Standard)   Pulse 80   Ht 5' 7" (1 702 m)   Wt 49 9 kg (110 lb)   BMI 17 23 kg/m²   Physical Exam  Constitutional:       General: He is not in acute distress  Appearance: He is not toxic-appearing or diaphoretic  HENT:      Head: Normocephalic and atraumatic  Mouth/Throat:      Mouth: Mucous membranes are moist    Eyes:      General: No scleral icterus  Extraocular Movements: Extraocular movements intact        Conjunctiva/sclera: Conjunctivae normal    Cardiovascular:      Rate and Rhythm: Normal rate and regular rhythm  Heart sounds: No friction rub  No gallop  Comments: No significant edema  Pulmonary:      Effort: Pulmonary effort is normal  No respiratory distress  Breath sounds: Normal breath sounds  No wheezing, rhonchi or rales  Abdominal:      General: Bowel sounds are normal  There is no distension  Palpations: Abdomen is soft  Tenderness: There is no abdominal tenderness  There is no rebound  Musculoskeletal:      Cervical back: Normal range of motion and neck supple  Neurological:      General: No focal deficit present  Mental Status: He is alert and oriented to person, place, and time  Mental status is at baseline  Psychiatric:         Mood and Affect: Mood normal          Behavior: Behavior normal          Thought Content:  Thought content normal          Judgment: Judgment normal          Medications:    Current Outpatient Medications:     amLODIPine (NORVASC) 10 mg tablet, Take 1 tablet (10 mg total) by mouth in the morning , Disp: 90 tablet, Rfl: 3    atenolol (TENORMIN) 50 mg tablet, Take 50 mg by mouth daily, Disp: , Rfl:     atorvastatin (LIPITOR) 10 mg tablet, Take 10 mg by mouth daily, Disp: , Rfl:     b complex vitamins tablet, Take 1 tablet by mouth daily, Disp: , Rfl:     bacitracin topical ointment 500 units/g topical ointment, Apply 1 large application topically 2 (two) times a day, Disp: 28 g, Rfl: 0    cholecalciferol (VITAMIN D3) 1,000 units tablet, Take 1,000 Units by mouth daily, Disp: , Rfl:     famotidine (PEPCID) 20 mg tablet, Take 1 tablet (20 mg total) by mouth 2 (two) times a day, Disp: 30 tablet, Rfl: 5    folic acid (FOLVITE) 1 mg tablet, Take 800 mcg by mouth daily , Disp: , Rfl:     Probiotic Product (PROBIOTIC-10 PO), Take 1 tablet by mouth daily, Disp: , Rfl:     pantoprazole (PROTONIX) 40 mg tablet, TAKE 1 TABLET(40 MG) BY MOUTH DAILY (Patient not taking: No sig reported), Disp: 90 tablet, Rfl: 1    Laboratory Results:  Lab Results   Component Value Date    WBC 12 55 (H) 03/30/2022    HGB 12 0 03/30/2022    HCT 39 8 03/30/2022    MCV 97 03/30/2022    PLT 60 (L) 03/30/2022     Lab Results   Component Value Date    SODIUM 144 04/11/2022    K 5 4 (H) 04/11/2022     (H) 04/11/2022    CO2 26 04/11/2022    BUN 54 (H) 04/11/2022    CREATININE 3 46 (H) 04/11/2022    GLUC 85 02/01/2020    CALCIUM 8 9 04/11/2022     Lab Results   Component Value Date    CALCIUM 8 9 04/11/2022    PHOS 4 0 04/02/2019     No results found for: LABPROT

## 2022-05-12 NOTE — PATIENT INSTRUCTIONS
You are here for follow-up to monitor your kidney function  Your creatinine level was 3 4 which is better than it was when we had checked it a week or so before that and her baseline over the last year so has been around in the low threes so it is stable  You have a solitary kidney since birth as you know  Blood pressure is running higher so were going to increase her amlodipine to 10 mg a day continue your other medications at the same dose  If you get any side effects like swelling please call me as a could be related to the amlodipine  You have 5 mg amlodipine is at home take 2 of them it 1 time to make sure you tolerated and I did send in a 10 mg prescription to her pharmacy  Labs and follow-up as scheduled  Please call me if you have any questions or concerns before next visit  We discussed dialysis modality education up put in the referral and someone should contact you just to go over the different types of dialysis for your education

## 2022-06-06 ENCOUNTER — TELEPHONE (OUTPATIENT)
Dept: NEPHROLOGY | Facility: CLINIC | Age: 87
End: 2022-06-06

## 2022-06-06 NOTE — TELEPHONE ENCOUNTER
Chano Underwood called requesting a call back from you to speak about dialysis   His best call back number is [phone]

## 2022-06-10 NOTE — TELEPHONE ENCOUNTER
Patient states he missed your call earlier in the week   Please try reaching him again when you have a chance

## 2022-08-01 ENCOUNTER — APPOINTMENT (OUTPATIENT)
Dept: LAB | Facility: CLINIC | Age: 87
End: 2022-08-01
Payer: MEDICARE

## 2022-08-01 ENCOUNTER — TELEPHONE (OUTPATIENT)
Dept: NEPHROLOGY | Facility: CLINIC | Age: 87
End: 2022-08-01

## 2022-08-01 DIAGNOSIS — N18.9 CHRONIC KIDNEY DISEASE, UNSPECIFIED CKD STAGE: ICD-10-CM

## 2022-08-01 LAB
ANION GAP SERPL CALCULATED.3IONS-SCNC: 3 MMOL/L (ref 4–13)
BUN SERPL-MCNC: 55 MG/DL (ref 5–25)
CALCIUM SERPL-MCNC: 8.7 MG/DL (ref 8.3–10.1)
CHLORIDE SERPL-SCNC: 115 MMOL/L (ref 96–108)
CO2 SERPL-SCNC: 25 MMOL/L (ref 21–32)
CREAT SERPL-MCNC: 3.39 MG/DL (ref 0.6–1.3)
GFR SERPL CREATININE-BSD FRML MDRD: 14 ML/MIN/1.73SQ M
GLUCOSE P FAST SERPL-MCNC: 101 MG/DL (ref 65–99)
POTASSIUM SERPL-SCNC: 4.5 MMOL/L (ref 3.5–5.3)
SODIUM SERPL-SCNC: 143 MMOL/L (ref 135–147)

## 2022-08-01 PROCEDURE — 80048 BASIC METABOLIC PNL TOTAL CA: CPT

## 2022-08-01 PROCEDURE — 36415 COLL VENOUS BLD VENIPUNCTURE: CPT

## 2022-08-01 NOTE — TELEPHONE ENCOUNTER
----- Message from Bala Chavira MD sent at 8/1/2022  2:12 PM EDT -----  The patients potassium and creatinine both a little bit better  I was happy to see that I will see him in a few weeks    Please let him know   ----- Message -----  From: Lab, Background User  Sent: 8/1/2022  12:11 PM EDT  To: Bala Chavira MD

## 2022-08-16 ENCOUNTER — APPOINTMENT (OUTPATIENT)
Dept: LAB | Facility: CLINIC | Age: 87
End: 2022-08-16
Payer: MEDICARE

## 2022-08-16 DIAGNOSIS — R94.5 NONSPECIFIC ABNORMAL RESULTS OF LIVER FUNCTION STUDY: ICD-10-CM

## 2022-08-16 LAB
ALBUMIN SERPL BCP-MCNC: 3.6 G/DL (ref 3.5–5)
ALP SERPL-CCNC: 110 U/L (ref 46–116)
ALT SERPL W P-5'-P-CCNC: 18 U/L (ref 12–78)
ANION GAP SERPL CALCULATED.3IONS-SCNC: 5 MMOL/L (ref 4–13)
AST SERPL W P-5'-P-CCNC: 24 U/L (ref 5–45)
BASOPHILS # BLD AUTO: 0.04 THOUSANDS/ΜL (ref 0–0.1)
BASOPHILS NFR BLD AUTO: 0 % (ref 0–1)
BILIRUB SERPL-MCNC: 0.35 MG/DL (ref 0.2–1)
BUN SERPL-MCNC: 53 MG/DL (ref 5–25)
CALCIUM SERPL-MCNC: 8.7 MG/DL (ref 8.3–10.1)
CHLORIDE SERPL-SCNC: 115 MMOL/L (ref 96–108)
CO2 SERPL-SCNC: 23 MMOL/L (ref 21–32)
CREAT SERPL-MCNC: 3.46 MG/DL (ref 0.6–1.3)
EOSINOPHIL # BLD AUTO: 0.11 THOUSAND/ΜL (ref 0–0.61)
EOSINOPHIL NFR BLD AUTO: 1 % (ref 0–6)
ERYTHROCYTE [DISTWIDTH] IN BLOOD BY AUTOMATED COUNT: 15.7 % (ref 11.6–15.1)
GFR SERPL CREATININE-BSD FRML MDRD: 14 ML/MIN/1.73SQ M
GLUCOSE P FAST SERPL-MCNC: 93 MG/DL (ref 65–99)
HCT VFR BLD AUTO: 36.5 % (ref 36.5–49.3)
HGB BLD-MCNC: 10.8 G/DL (ref 12–17)
IMM GRANULOCYTES # BLD AUTO: 0.21 THOUSAND/UL (ref 0–0.2)
IMM GRANULOCYTES NFR BLD AUTO: 2 % (ref 0–2)
LYMPHOCYTES # BLD AUTO: 2.56 THOUSANDS/ΜL (ref 0.6–4.47)
LYMPHOCYTES NFR BLD AUTO: 28 % (ref 14–44)
MCH RBC QN AUTO: 29.2 PG (ref 26.8–34.3)
MCHC RBC AUTO-ENTMCNC: 29.6 G/DL (ref 31.4–37.4)
MCV RBC AUTO: 99 FL (ref 82–98)
MONOCYTES # BLD AUTO: 1.94 THOUSAND/ΜL (ref 0.17–1.22)
MONOCYTES NFR BLD AUTO: 21 % (ref 4–12)
NEUTROPHILS # BLD AUTO: 4.39 THOUSANDS/ΜL (ref 1.85–7.62)
NEUTS SEG NFR BLD AUTO: 48 % (ref 43–75)
NRBC BLD AUTO-RTO: 0 /100 WBCS
PLATELET # BLD AUTO: 56 THOUSANDS/UL (ref 149–390)
POTASSIUM SERPL-SCNC: 5.4 MMOL/L (ref 3.5–5.3)
PROT SERPL-MCNC: 7.1 G/DL (ref 6.4–8.4)
RBC # BLD AUTO: 3.7 MILLION/UL (ref 3.88–5.62)
SODIUM SERPL-SCNC: 143 MMOL/L (ref 135–147)
WBC # BLD AUTO: 9.25 THOUSAND/UL (ref 4.31–10.16)

## 2022-08-16 PROCEDURE — 85025 COMPLETE CBC W/AUTO DIFF WBC: CPT

## 2022-08-16 PROCEDURE — 36415 COLL VENOUS BLD VENIPUNCTURE: CPT

## 2022-08-16 PROCEDURE — 80053 COMPREHEN METABOLIC PANEL: CPT

## 2022-08-18 ENCOUNTER — OFFICE VISIT (OUTPATIENT)
Dept: NEPHROLOGY | Facility: CLINIC | Age: 87
End: 2022-08-18
Payer: MEDICARE

## 2022-08-18 VITALS
WEIGHT: 108 LBS | BODY MASS INDEX: 16.95 KG/M2 | DIASTOLIC BLOOD PRESSURE: 60 MMHG | HEIGHT: 67 IN | SYSTOLIC BLOOD PRESSURE: 140 MMHG | HEART RATE: 70 BPM

## 2022-08-18 DIAGNOSIS — Q60.0 SOLITARY KIDNEY, CONGENITAL: ICD-10-CM

## 2022-08-18 DIAGNOSIS — I10 HYPERTENSION, UNSPECIFIED TYPE: ICD-10-CM

## 2022-08-18 DIAGNOSIS — N18.9 CHRONIC KIDNEY DISEASE, UNSPECIFIED CKD STAGE: Primary | ICD-10-CM

## 2022-08-18 PROCEDURE — 99214 OFFICE O/P EST MOD 30 MIN: CPT | Performed by: INTERNAL MEDICINE

## 2022-08-18 NOTE — PATIENT INSTRUCTIONS
You are here for follow-up sounds like her doing well and stable from a medical standpoint  Your appetite is stable you have no significant swelling her breathing comfortably  Your creatinine level was 3 4 which is stable for the last 4 months and even looking back over the last close to year it is relatively about the same  Your blood count is over 10 so that is good for patient with kidney disease  You did tell me that you enjoyed the education session and if it ever came to dialysis you would probably do the hemodialysis but again at this point it was just educate you and give you an idea and it is not there  We will continue with current medications blood pressure is doing better  Labs and follow-up as scheduled please call me if you have any questions or problems before next visit

## 2022-08-18 NOTE — LETTER
August 18, 2022     Oanh Toledo DO  [address]    Patient: Evangelina Quinteros   YOB: 1930   Date of Visit: 8/18/2022       Dear Dr Margarita Moses:    Thank you for referring Evangelina Quinteros to me for evaluation  Below are my notes for this consultation  If you have questions, please do not hesitate to call me  I look forward to following your patient along with you  Sincerely,        Gregory Macias MD        CC: No Recipients  Gregory Macias MD  8/18/2022  5:34 PM  Sign when Signing Visit  NEPHROLOGY PROGRESS NOTE    Evangelina Quinteros 80 y o  male MRN: [id number]  Unit/Bed#:  Encounter: [id number]  Reason for Consult:  Chronic kidney disease    Patient is here for routine follow-up he looks well just celebrated is 89nd birthday  Says that he and his wife were doing well things have been stable appetite stable and he has no complaints for me  He is tolerating his medications  ASSESSMENT/PLAN:  1  Renal    The patient has a congenital solitary kidney  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 in his solitary kidney overall these years  There is some proteinuria so he could have some FSGS  His creatinine stable at 3 4  He did go to the [facility] education and he enjoyed it any said if it ever came to it he would do incenter hemodialysis  At this point I explained him things have been stable very slowly progressive he is not manifesting any symptoms so I was very proud that he did participate in the visit and education session  We will continue to monitor with blood pressure control and current medications  His blood pressure is improved on the increased amlodipine dose  2  Hypertension    Patient's medications were reviewed blood pressure has come down is acceptable on his current regiment last visit I increased amlodipine and he is doing well with      Labs and follow-up as scheduled he was told to call if there is any problems or concerns before next visit  SUBJECTIVE:  Review of Systems   Constitutional: Negative for chills, diaphoresis, fever and malaise/fatigue  HENT: Negative  Eyes: Negative  Cardiovascular: Negative for chest pain, dyspnea on exertion, leg swelling and orthopnea  Respiratory: Negative  Negative for cough, shortness of breath, sputum production and wheezing  Gastrointestinal: Negative for abdominal pain, diarrhea, nausea and vomiting  Genitourinary: Negative for dysuria, flank pain, hematuria and incomplete emptying  Neurological: Negative for dizziness, focal weakness, headaches and weakness  Psychiatric/Behavioral: Negative for altered mental status, depression, hallucinations and hypervigilance  OBJECTIVE:  Current Weight: Weight - Scale: 49 kg (108 lb)  [email or web address]:     Blood pressure 140/60, pulse 70, height 5' 7" (1 702 m), weight 49 kg (108 lb)  , Body mass index is 16 92 kg/m²  [unfilled]    Physical Exam: /60 (BP Location: Left arm, Patient Position: Sitting, Cuff Size: Standard)   Pulse 70   Ht 5' 7" (1 702 m)   Wt 49 kg (108 lb)   BMI 16 92 kg/m²   Physical Exam  Constitutional:       General: He is not in acute distress  Appearance: He is not toxic-appearing or diaphoretic  HENT:      Head: Normocephalic and atraumatic  Nose: Nose normal       Mouth/Throat:      Mouth: Mucous membranes are dry  Eyes:      General: No scleral icterus  Extraocular Movements: Extraocular movements intact  Cardiovascular:      Rate and Rhythm: Normal rate and regular rhythm  Heart sounds: No friction rub  No gallop  Comments: Trace ankle edema  Pulmonary:      Effort: Pulmonary effort is normal  No respiratory distress  Breath sounds: No wheezing, rhonchi or rales  Abdominal:      General: Bowel sounds are normal  There is no distension  Palpations: Abdomen is soft  Tenderness: There is no abdominal tenderness  There is no rebound  Musculoskeletal:      Cervical back: Normal range of motion and neck supple  Neurological:      General: No focal deficit present  Mental Status: He is alert and oriented to person, place, and time  Mental status is at baseline  Psychiatric:         Mood and Affect: Mood normal          Behavior: Behavior normal          Thought Content:  Thought content normal          Judgment: Judgment normal          Medications:    Current Outpatient Medications:     amLODIPine (NORVASC) 10 mg tablet, Take 1 tablet (10 mg total) by mouth in the morning , Disp: 90 tablet, Rfl: 3    atenolol (TENORMIN) 50 mg tablet, Take 50 mg by mouth daily, Disp: , Rfl:     atorvastatin (LIPITOR) 10 mg tablet, Take 10 mg by mouth daily, Disp: , Rfl:     b complex vitamins tablet, Take 1 tablet by mouth daily, Disp: , Rfl:     cholecalciferol (VITAMIN D3) 1,000 units tablet, Take 1,000 Units by mouth daily, Disp: , Rfl:     folic acid (FOLVITE) 1 mg tablet, Take 800 mcg by mouth daily , Disp: , Rfl:     Probiotic Product (PROBIOTIC-10 PO), Take 1 tablet by mouth daily, Disp: , Rfl:     bacitracin topical ointment 500 units/g topical ointment, Apply 1 large application topically 2 (two) times a day (Patient not taking: Reported on 8/18/2022), Disp: 28 g, Rfl: 0    famotidine (PEPCID) 20 mg tablet, Take 1 tablet (20 mg total) by mouth 2 (two) times a day (Patient not taking: Reported on 8/18/2022), Disp: 30 tablet, Rfl: 5    pantoprazole (PROTONIX) 40 mg tablet, TAKE 1 TABLET(40 MG) BY MOUTH DAILY (Patient not taking: No sig reported), Disp: 90 tablet, Rfl: 1    Laboratory Results:  Lab Results   Component Value Date    WBC 9 25 08/16/2022    HGB 10 8 (L) 08/16/2022    HCT 36 5 08/16/2022    MCV 99 (H) 08/16/2022    PLT 56 (L) 08/16/2022     Lab Results   Component Value Date    SODIUM 143 08/16/2022    K 5 4 (H) 08/16/2022     (H) 08/16/2022    CO2 23 08/16/2022    BUN 53 (H) 08/16/2022    CREATININE 3 46 (H) 08/16/2022 GLUC 85 02/01/2020    CALCIUM 8 7 08/16/2022     Lab Results   Component Value Date    CALCIUM 8 7 08/16/2022    PHOS 4 0 04/02/2019     No results found for: LABPROT

## 2022-08-18 NOTE — PROGRESS NOTES
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 com and he enjoyed it any said if it ever came to it he would do incenter hemodialysis  At this point I explained him things have been stable very slowly progressive he is not manifesting any symptoms so I was very proud that he did participate in the visit and education session  We will continue to monitor with blood pressure control and current medications  His blood pressure is improved on the increased amlodipine dose  2  Hypertension    Patient's medications were reviewed blood pressure has come down is acceptable on his current regiment last visit I increased amlodipine and he is doing well with  Labs and follow-up as scheduled he was told to call if there is any problems or concerns before next visit  SUBJECTIVE:  Review of Systems   Constitutional: Negative for chills, diaphoresis, fever and malaise/fatigue  HENT: Negative  Eyes: Negative  Cardiovascular: Negative for chest pain, dyspnea on exertion, leg swelling and orthopnea  Respiratory: Negative  Negative for cough, shortness of breath, sputum production and wheezing  Gastrointestinal: Negative for abdominal pain, diarrhea, nausea and vomiting  Genitourinary: Negative for dysuria, flank pain, hematuria and incomplete emptying  Neurological: Negative for dizziness, focal weakness, headaches and weakness  Psychiatric/Behavioral: Negative for altered mental status, depression, hallucinations and hypervigilance  OBJECTIVE:  Current Weight: Weight - Scale: 49 kg (108 lb)  [email or web address]:     Blood pressure 140/60, pulse 70, height 5' 7" (1 702 m), weight 49 kg (108 lb)  , Body mass index is 16 92 kg/m²  [unfilled]    Physical Exam: /60 (BP Location: Left arm, Patient Position: Sitting, Cuff Size: Standard)   Pulse 70   Ht 5' 7" (1 702 m)   Wt 49 kg (108 lb)   BMI 16 92 kg/m²   Physical Exam  Constitutional:       General: He is not in acute distress  Appearance: He is not toxic-appearing or diaphoretic  HENT:      Head: Normocephalic and atraumatic  Nose: Nose normal       Mouth/Throat:      Mouth: Mucous membranes are dry  Eyes:      General: No scleral icterus  Extraocular Movements: Extraocular movements intact  Cardiovascular:      Rate and Rhythm: Normal rate and regular rhythm  Heart sounds: No friction rub  No gallop  Comments: Trace ankle edema  Pulmonary:      Effort: Pulmonary effort is normal  No respiratory distress  Breath sounds: No wheezing, rhonchi or rales  Abdominal:      General: Bowel sounds are normal  There is no distension  Palpations: Abdomen is soft  Tenderness: There is no abdominal tenderness  There is no rebound  Musculoskeletal:      Cervical back: Normal range of motion and neck supple  Neurological:      General: No focal deficit present  Mental Status: He is alert and oriented to person, place, and time  Mental status is at baseline  Psychiatric:         Mood and Affect: Mood normal          Behavior: Behavior normal          Thought Content:  Thought content normal          Judgment: Judgment normal          Medications:    Current Outpatient Medications:     amLODIPine (NORVASC) 10 mg tablet, Take 1 tablet (10 mg total) by mouth in the morning , Disp: 90 tablet, Rfl: 3    atenolol (TENORMIN) 50 mg tablet, Take 50 mg by mouth daily, Disp: , Rfl:     atorvastatin (LIPITOR) 10 mg tablet, Take 10 mg by mouth daily, Disp: , Rfl:     b complex vitamins tablet, Take 1 tablet by mouth daily, Disp: , Rfl:     cholecalciferol (VITAMIN D3) 1,000 units tablet, Take 1,000 Units by mouth daily, Disp: , Rfl:     folic acid (FOLVITE) 1 mg tablet, Take 800 mcg by mouth daily , Disp: , Rfl:     Probiotic Product (PROBIOTIC-10 PO), Take 1 tablet by mouth daily, Disp: , Rfl:     bacitracin topical ointment 500 units/g topical ointment, Apply 1 large application topically 2 (two) times a day (Patient not taking: Reported on 8/18/2022), Disp: 28 g, Rfl: 0    famotidine (PEPCID) 20 mg tablet, Take 1 tablet (20 mg total) by mouth 2 (two) times a day (Patient not taking: Reported on 8/18/2022), Disp: 30 tablet, Rfl: 5    pantoprazole (PROTONIX) 40 mg tablet, TAKE 1 TABLET(40 MG) BY MOUTH DAILY (Patient not taking: No sig reported), Disp: 90 tablet, Rfl: 1    Laboratory Results:  Lab Results   Component Value Date    WBC 9 25 08/16/2022    HGB 10 8 (L) 08/16/2022    HCT 36 5 08/16/2022    MCV 99 (H) 08/16/2022    PLT 56 (L) 08/16/2022     Lab Results   Component Value Date    SODIUM 143 08/16/2022    K 5 4 (H) 08/16/2022     (H) 08/16/2022    CO2 23 08/16/2022    BUN 53 (H) 08/16/2022    CREATININE 3 46 (H) 08/16/2022    GLUC 85 02/01/2020    CALCIUM 8 7 08/16/2022     Lab Results   Component Value Date    CALCIUM 8 7 08/16/2022    PHOS 4 0 04/02/2019     No results found for: LABPROT

## 2022-08-19 ENCOUNTER — APPOINTMENT (OUTPATIENT)
Dept: LAB | Facility: CLINIC | Age: 87
End: 2022-08-19
Payer: MEDICARE

## 2022-08-19 DIAGNOSIS — I10 ESSENTIAL HYPERTENSION, MALIGNANT: ICD-10-CM

## 2022-08-19 DIAGNOSIS — E78.2 MIXED HYPERLIPIDEMIA: ICD-10-CM

## 2022-08-19 LAB
ALBUMIN SERPL BCP-MCNC: 3.5 G/DL (ref 3.5–5)
ALP SERPL-CCNC: 114 U/L (ref 46–116)
ALT SERPL W P-5'-P-CCNC: 19 U/L (ref 12–78)
ANION GAP SERPL CALCULATED.3IONS-SCNC: 6 MMOL/L (ref 4–13)
AST SERPL W P-5'-P-CCNC: 23 U/L (ref 5–45)
BILIRUB SERPL-MCNC: 0.46 MG/DL (ref 0.2–1)
BUN SERPL-MCNC: 50 MG/DL (ref 5–25)
CALCIUM SERPL-MCNC: 8.6 MG/DL (ref 8.3–10.1)
CHLORIDE SERPL-SCNC: 116 MMOL/L (ref 96–108)
CHOLEST SERPL-MCNC: 124 MG/DL
CO2 SERPL-SCNC: 23 MMOL/L (ref 21–32)
CREAT SERPL-MCNC: 3.44 MG/DL (ref 0.6–1.3)
GFR SERPL CREATININE-BSD FRML MDRD: 14 ML/MIN/1.73SQ M
GLUCOSE P FAST SERPL-MCNC: 92 MG/DL (ref 65–99)
HDLC SERPL-MCNC: 63 MG/DL
LDLC SERPL CALC-MCNC: 44 MG/DL (ref 0–100)
NONHDLC SERPL-MCNC: 61 MG/DL
POTASSIUM SERPL-SCNC: 4.8 MMOL/L (ref 3.5–5.3)
PROT SERPL-MCNC: 7.2 G/DL (ref 6.4–8.4)
SODIUM SERPL-SCNC: 145 MMOL/L (ref 135–147)
TRIGL SERPL-MCNC: 83 MG/DL
TSH SERPL DL<=0.05 MIU/L-ACNC: 1.7 UIU/ML (ref 0.45–4.5)

## 2022-08-19 PROCEDURE — 80061 LIPID PANEL: CPT

## 2022-08-19 PROCEDURE — 36415 COLL VENOUS BLD VENIPUNCTURE: CPT

## 2022-08-19 PROCEDURE — 80053 COMPREHEN METABOLIC PANEL: CPT

## 2022-08-19 PROCEDURE — 84443 ASSAY THYROID STIM HORMONE: CPT

## 2022-12-01 ENCOUNTER — APPOINTMENT (OUTPATIENT)
Dept: LAB | Facility: CLINIC | Age: 87
End: 2022-12-01

## 2022-12-01 DIAGNOSIS — N18.9 CHRONIC KIDNEY DISEASE, UNSPECIFIED CKD STAGE: ICD-10-CM

## 2022-12-01 LAB
ANION GAP SERPL CALCULATED.3IONS-SCNC: 8 MMOL/L (ref 4–13)
ANISOCYTOSIS BLD QL SMEAR: PRESENT
BASOPHILS # BLD MANUAL: 0 THOUSAND/UL (ref 0–0.1)
BASOPHILS NFR MAR MANUAL: 0 % (ref 0–1)
BUN SERPL-MCNC: 58 MG/DL (ref 5–25)
CALCIUM SERPL-MCNC: 9 MG/DL (ref 8.3–10.1)
CHLORIDE SERPL-SCNC: 116 MMOL/L (ref 96–108)
CO2 SERPL-SCNC: 20 MMOL/L (ref 21–32)
CREAT SERPL-MCNC: 3.5 MG/DL (ref 0.6–1.3)
EOSINOPHIL # BLD MANUAL: 0.39 THOUSAND/UL (ref 0–0.4)
EOSINOPHIL NFR BLD MANUAL: 3 % (ref 0–6)
ERYTHROCYTE [DISTWIDTH] IN BLOOD BY AUTOMATED COUNT: 15.4 % (ref 11.6–15.1)
GFR SERPL CREATININE-BSD FRML MDRD: 14 ML/MIN/1.73SQ M
GLUCOSE P FAST SERPL-MCNC: 95 MG/DL (ref 65–99)
HCT VFR BLD AUTO: 37.7 % (ref 36.5–49.3)
HGB BLD-MCNC: 10.9 G/DL (ref 12–17)
LYMPHOCYTES # BLD AUTO: 19 % (ref 14–44)
LYMPHOCYTES # BLD AUTO: 2.48 THOUSAND/UL (ref 0.6–4.47)
MACROCYTES BLD QL AUTO: PRESENT
MCH RBC QN AUTO: 29 PG (ref 26.8–34.3)
MCHC RBC AUTO-ENTMCNC: 28.9 G/DL (ref 31.4–37.4)
MCV RBC AUTO: 100 FL (ref 82–98)
MONOCYTES # BLD AUTO: 2.88 THOUSAND/UL (ref 0–1.22)
MONOCYTES NFR BLD: 22 % (ref 4–12)
NEUTROPHILS # BLD MANUAL: 6.8 THOUSAND/UL (ref 1.85–7.62)
NEUTS BAND NFR BLD MANUAL: 1 % (ref 0–8)
NEUTS SEG NFR BLD AUTO: 51 % (ref 43–75)
PLATELET # BLD AUTO: 62 THOUSANDS/UL (ref 149–390)
PLATELET BLD QL SMEAR: ABNORMAL
POLYCHROMASIA BLD QL SMEAR: PRESENT
POTASSIUM SERPL-SCNC: 4.9 MMOL/L (ref 3.5–5.3)
RBC # BLD AUTO: 3.76 MILLION/UL (ref 3.88–5.62)
RBC MORPH BLD: PRESENT
SODIUM SERPL-SCNC: 144 MMOL/L (ref 135–147)
VARIANT LYMPHS # BLD AUTO: 4 %
WBC # BLD AUTO: 13.07 THOUSAND/UL (ref 4.31–10.16)

## 2022-12-02 ENCOUNTER — TELEPHONE (OUTPATIENT)
Dept: NEPHROLOGY | Facility: CLINIC | Age: 87
End: 2022-12-02

## 2022-12-02 NOTE — TELEPHONE ENCOUNTER
----- Message from Rachel Brooks MD sent at 12/2/2022  6:11 AM EST -----  Let him know creatinine stable and not worse

## 2022-12-14 ENCOUNTER — OFFICE VISIT (OUTPATIENT)
Dept: NEPHROLOGY | Facility: CLINIC | Age: 87
End: 2022-12-14

## 2022-12-14 VITALS
SYSTOLIC BLOOD PRESSURE: 146 MMHG | WEIGHT: 105.6 LBS | HEIGHT: 67 IN | BODY MASS INDEX: 16.57 KG/M2 | HEART RATE: 70 BPM | DIASTOLIC BLOOD PRESSURE: 72 MMHG

## 2022-12-14 DIAGNOSIS — N18.9 CHRONIC KIDNEY DISEASE, UNSPECIFIED CKD STAGE: Primary | ICD-10-CM

## 2022-12-14 DIAGNOSIS — Q60.0 SOLITARY KIDNEY, CONGENITAL: ICD-10-CM

## 2022-12-14 DIAGNOSIS — N18.5 CHRONIC RENAL DISEASE, STAGE V (HCC): ICD-10-CM

## 2022-12-14 NOTE — LETTER
December 14, 2022     [facility] [id number]    Patient: Louise Chavez   YOB: 1930   Date of Visit: 12/14/2022       Dear Dr Rose Fierro:    Thank you for referring Louise Chavez to me for evaluation  Below are my notes for this consultation  If you have questions, please do not hesitate to call me  I look forward to following your patient along with you  Sincerely,        Julienne Caro MD        CC: No Recipients  Julienne Caro MD  12/14/2022  8:57 AM  Sign when Signing Visit  NEPHROLOGY PROGRESS NOTE    Louise Chavez 80 y o  male MRN: [id number]  Unit/Bed#:  Encounter: [id number]  Reason for Consult: Chronic kidney disease    The patient is here for follow-up he looks well says that his health has been good he has had no hospitalizations or changes in his health status or medications  No specific complaints as well  ASSESSMENT/PLAN:  #1  Renal    The patient has chronic kidney disease and a solitary kidney with proteinuria so I suspect he may have some underlying nephrosclerosis and eventually some focal sclerosis  Creatinine is 3 5 which is stable over the last 9 months  Electrolytes are acceptable is no volume overload and he has no symptoms of this  He has done kidney smart education in the past and at this point we will continue to observe conservatively with blood pressure management  Overall he is doing well there is been no rapid progression and will continue to follow  Continue current medications  Labs and follow-up as scheduled    He was asked to call if there is any problems or issues before next visit  SUBJECTIVE:  Review of Systems   Constitutional: Negative for chills, diaphoresis, fever and malaise/fatigue  HENT: Negative  Eyes: Negative  Cardiovascular: Negative  Negative for chest pain, dyspnea on exertion, leg swelling and orthopnea  Respiratory: Negative    Negative for cough, shortness of breath, sputum production and wheezing  Gastrointestinal: Negative for abdominal pain, diarrhea, nausea and vomiting  Genitourinary: Negative for dysuria, flank pain, hematuria and incomplete emptying  Nocturia   Neurological: Negative for dizziness, focal weakness, headaches and weakness  Psychiatric/Behavioral: Negative for altered mental status, depression, hallucinations and hypervigilance  OBJECTIVE:  Current Weight:    [email or web address] com:     Height 5' 7" (1 702 m)  , Body mass index is 16 92 kg/m²  @[facility]@    Physical Exam: Ht 5' 7" (1 702 m)   BMI 16 92 kg/m²   Physical Exam  Constitutional:       General: He is not in acute distress  Appearance: He is not ill-appearing, toxic-appearing or diaphoretic  HENT:      Head: Normocephalic and atraumatic  Nose: Nose normal       Mouth/Throat:      Mouth: Mucous membranes are moist    Eyes:      General: No scleral icterus  Extraocular Movements: Extraocular movements intact  Cardiovascular:      Rate and Rhythm: Normal rate and regular rhythm  Heart sounds: No friction rub  No gallop  Comments: No edema  Pulmonary:      Effort: Pulmonary effort is normal  No respiratory distress  Breath sounds: No wheezing, rhonchi or rales  Abdominal:      General: Bowel sounds are normal  There is no distension  Palpations: Abdomen is soft  Tenderness: There is no abdominal tenderness  There is no rebound  Musculoskeletal:      Cervical back: Normal range of motion and neck supple  Neurological:      General: No focal deficit present  Mental Status: He is alert and oriented to person, place, and time  Mental status is at baseline  Psychiatric:         Mood and Affect: Mood normal          Behavior: Behavior normal          Thought Content:  Thought content normal          Judgment: Judgment normal          Medications:    Current Outpatient Medications:   •  amLODIPine (NORVASC) 10 mg tablet, Take 1 tablet (10 mg total) by mouth in the morning , Disp: 90 tablet, Rfl: 3  •  atenolol (TENORMIN) 50 mg tablet, Take 50 mg by mouth daily, Disp: , Rfl:   •  atorvastatin (LIPITOR) 10 mg tablet, Take 10 mg by mouth daily, Disp: , Rfl:   •  b complex vitamins tablet, Take 1 tablet by mouth daily, Disp: , Rfl:   •  bacitracin topical ointment 500 units/g topical ointment, Apply 1 large application topically 2 (two) times a day (Patient not taking: Reported on 8/18/2022), Disp: 28 g, Rfl: 0  •  cholecalciferol (VITAMIN D3) 1,000 units tablet, Take 1,000 Units by mouth daily, Disp: , Rfl:   •  famotidine (PEPCID) 20 mg tablet, Take 1 tablet (20 mg total) by mouth 2 (two) times a day (Patient not taking: Reported on 8/18/2022), Disp: 30 tablet, Rfl: 5  •  folic acid (FOLVITE) 1 mg tablet, Take 800 mcg by mouth daily , Disp: , Rfl:   •  pantoprazole (PROTONIX) 40 mg tablet, TAKE 1 TABLET(40 MG) BY MOUTH DAILY (Patient not taking: No sig reported), Disp: 90 tablet, Rfl: 1  •  Probiotic Product (PROBIOTIC-10 PO), Take 1 tablet by mouth daily, Disp: , Rfl:     Laboratory Results:  Lab Results   Component Value Date    WBC 13 07 (H) 12/01/2022    HGB 10 9 (L) 12/01/2022    HCT 37 7 12/01/2022     (H) 12/01/2022    PLT 62 (L) 12/01/2022     Lab Results   Component Value Date    SODIUM 144 12/01/2022    K 4 9 12/01/2022     (H) 12/01/2022    CO2 20 (L) 12/01/2022    BUN 58 (H) 12/01/2022    CREATININE 3 50 (H) 12/01/2022    GLUC 85 02/01/2020    CALCIUM 9 0 12/01/2022     Lab Results   Component Value Date    CALCIUM 9 0 12/01/2022    PHOS 4 0 04/02/2019     No results found for: LABPROT

## 2022-12-14 NOTE — PROGRESS NOTES
NEPHROLOGY PROGRESS NOTE    Angel Suarez 80 y o  male MRN: [id number]  Unit/Bed#:  Encounter: [id number]  Reason for Consult: Chronic kidney disease    The patient is here for follow-up he looks well says that his health has been good he has had no hospitalizations or changes in his health status or medications  No specific complaints as well  ASSESSMENT/PLAN:  #1  Renal    The patient has chronic kidney disease and a solitary kidney with proteinuria so I suspect he may have some underlying nephrosclerosis and eventually some focal sclerosis  Creatinine is 3 5 which is stable over the last 9 months  Electrolytes are acceptable is no volume overload and he has no symptoms of this  He has done kidney smart education in the past and at this point we will continue to observe conservatively with blood pressure management  Overall he is doing well there is been no rapid progression and will continue to follow  Continue current medications  Labs and follow-up as scheduled    He was asked to call if there is any problems or issues before next visit  SUBJECTIVE:  Review of Systems   Constitutional: Negative for chills, diaphoresis, fever and malaise/fatigue  HENT: Negative  Eyes: Negative  Cardiovascular: Negative  Negative for chest pain, dyspnea on exertion, leg swelling and orthopnea  Respiratory: Negative  Negative for cough, shortness of breath, sputum production and wheezing  Gastrointestinal: Negative for abdominal pain, diarrhea, nausea and vomiting  Genitourinary: Negative for dysuria, flank pain, hematuria and incomplete emptying  Nocturia   Neurological: Negative for dizziness, focal weakness, headaches and weakness  Psychiatric/Behavioral: Negative for altered mental status, depression, hallucinations and hypervigilance  OBJECTIVE:  Current Weight:    [email or web address]:     Height 5' 7" (1 702 m)  , Body mass index is 16 92 kg/m²      [unfilled]    Physical Exam: Ht 5' 7" (1 702 m)   BMI 16 92 kg/m²   Physical Exam  Constitutional:       General: He is not in acute distress  Appearance: He is not ill-appearing, toxic-appearing or diaphoretic  HENT:      Head: Normocephalic and atraumatic  Nose: Nose normal       Mouth/Throat:      Mouth: Mucous membranes are moist    Eyes:      General: No scleral icterus  Extraocular Movements: Extraocular movements intact  Cardiovascular:      Rate and Rhythm: Normal rate and regular rhythm  Heart sounds: No friction rub  No gallop  Comments: No edema  Pulmonary:      Effort: Pulmonary effort is normal  No respiratory distress  Breath sounds: No wheezing, rhonchi or rales  Abdominal:      General: Bowel sounds are normal  There is no distension  Palpations: Abdomen is soft  Tenderness: There is no abdominal tenderness  There is no rebound  Musculoskeletal:      Cervical back: Normal range of motion and neck supple  Neurological:      General: No focal deficit present  Mental Status: He is alert and oriented to person, place, and time  Mental status is at baseline  Psychiatric:         Mood and Affect: Mood normal          Behavior: Behavior normal          Thought Content:  Thought content normal          Judgment: Judgment normal          Medications:    Current Outpatient Medications:   •  amLODIPine (NORVASC) 10 mg tablet, Take 1 tablet (10 mg total) by mouth in the morning , Disp: 90 tablet, Rfl: 3  •  atenolol (TENORMIN) 50 mg tablet, Take 50 mg by mouth daily, Disp: , Rfl:   •  atorvastatin (LIPITOR) 10 mg tablet, Take 10 mg by mouth daily, Disp: , Rfl:   •  b complex vitamins tablet, Take 1 tablet by mouth daily, Disp: , Rfl:   •  bacitracin topical ointment 500 units/g topical ointment, Apply 1 large application topically 2 (two) times a day (Patient not taking: Reported on 8/18/2022), Disp: 28 g, Rfl: 0  •  cholecalciferol (VITAMIN D3) 1,000 units tablet, Take 1,000 Units by mouth daily, Disp: , Rfl:   •  famotidine (PEPCID) 20 mg tablet, Take 1 tablet (20 mg total) by mouth 2 (two) times a day (Patient not taking: Reported on 8/18/2022), Disp: 30 tablet, Rfl: 5  •  folic acid (FOLVITE) 1 mg tablet, Take 800 mcg by mouth daily , Disp: , Rfl:   •  pantoprazole (PROTONIX) 40 mg tablet, TAKE 1 TABLET(40 MG) BY MOUTH DAILY (Patient not taking: No sig reported), Disp: 90 tablet, Rfl: 1  •  Probiotic Product (PROBIOTIC-10 PO), Take 1 tablet by mouth daily, Disp: , Rfl:     Laboratory Results:  Lab Results   Component Value Date    WBC 13 07 (H) 12/01/2022    HGB 10 9 (L) 12/01/2022    HCT 37 7 12/01/2022     (H) 12/01/2022    PLT 62 (L) 12/01/2022     Lab Results   Component Value Date    SODIUM 144 12/01/2022    K 4 9 12/01/2022     (H) 12/01/2022    CO2 20 (L) 12/01/2022    BUN 58 (H) 12/01/2022    CREATININE 3 50 (H) 12/01/2022    GLUC 85 02/01/2020    CALCIUM 9 0 12/01/2022     Lab Results   Component Value Date    CALCIUM 9 0 12/01/2022    PHOS 4 0 04/02/2019     No results found for: LABPROT

## 2022-12-14 NOTE — PATIENT INSTRUCTIONS
You are here for follow-up on glad to hear you are doing well and that your health is been good you have had no changes in medications  Creatinine levels 3 5 which is stable over the last 9 months so its not worse there is no symptoms related to it or not swelling  Blood pressure is good  Continue with labs and monitor and call me if you have any problems before next visit

## 2023-03-23 ENCOUNTER — TELEPHONE (OUTPATIENT)
Dept: NEPHROLOGY | Facility: CLINIC | Age: 88
End: 2023-03-23

## 2023-03-23 NOTE — TELEPHONE ENCOUNTER
Patient called stating he is admitted to hospital at [facility] and they are discussing starting him on dialysis  He would like to speak with Dr Maxine Tucker prior to making these decisions  Informed the patient Dr Maxine Tucker is out this week and another provider could look over things  Patient declined stating he would wait until Dr Maxine Tucker can call him next week  Patient can be reached via cell phone at [phone]  He will call back if he decides that he would like to have another provider review his chart

## 2023-07-06 ENCOUNTER — TELEPHONE (OUTPATIENT)
Dept: NEPHROLOGY | Facility: CLINIC | Age: 88
End: 2023-07-06

## 2023-07-06 NOTE — TELEPHONE ENCOUNTER
HAROLDO for patient reminding him to go for lab work before his appt on 7/13. Asked him to call back if he has any questions.

## 2023-07-07 ENCOUNTER — TELEPHONE (OUTPATIENT)
Dept: NEPHROLOGY | Facility: CLINIC | Age: 88
End: 2023-07-07

## 2023-07-07 ENCOUNTER — APPOINTMENT (OUTPATIENT)
Dept: LAB | Facility: CLINIC | Age: 88
End: 2023-07-07
Payer: MEDICARE

## 2023-07-07 DIAGNOSIS — N18.9 CHRONIC KIDNEY DISEASE, UNSPECIFIED CKD STAGE: ICD-10-CM

## 2023-07-07 LAB
ANION GAP SERPL CALCULATED.3IONS-SCNC: 4 MMOL/L
BASOPHILS # BLD AUTO: 0.03 THOUSANDS/ÂΜL (ref 0–0.1)
BASOPHILS NFR BLD AUTO: 0 % (ref 0–1)
BUN SERPL-MCNC: 54 MG/DL (ref 5–25)
CALCIUM SERPL-MCNC: 8.4 MG/DL (ref 8.3–10.1)
CHLORIDE SERPL-SCNC: 116 MMOL/L (ref 96–108)
CO2 SERPL-SCNC: 22 MMOL/L (ref 21–32)
CREAT SERPL-MCNC: 4.31 MG/DL (ref 0.6–1.3)
EOSINOPHIL # BLD AUTO: 0.09 THOUSAND/ÂΜL (ref 0–0.61)
EOSINOPHIL NFR BLD AUTO: 1 % (ref 0–6)
ERYTHROCYTE [DISTWIDTH] IN BLOOD BY AUTOMATED COUNT: 15.8 % (ref 11.6–15.1)
GFR SERPL CREATININE-BSD FRML MDRD: 11 ML/MIN/1.73SQ M
GLUCOSE P FAST SERPL-MCNC: 93 MG/DL (ref 65–99)
HCT VFR BLD AUTO: 35 % (ref 36.5–49.3)
HGB BLD-MCNC: 10.4 G/DL (ref 12–17)
IMM GRANULOCYTES # BLD AUTO: 0.13 THOUSAND/UL (ref 0–0.2)
IMM GRANULOCYTES NFR BLD AUTO: 1 % (ref 0–2)
LYMPHOCYTES # BLD AUTO: 2.12 THOUSANDS/ÂΜL (ref 0.6–4.47)
LYMPHOCYTES NFR BLD AUTO: 20 % (ref 14–44)
MCH RBC QN AUTO: 29.5 PG (ref 26.8–34.3)
MCHC RBC AUTO-ENTMCNC: 29.7 G/DL (ref 31.4–37.4)
MCV RBC AUTO: 99 FL (ref 82–98)
MONOCYTES # BLD AUTO: 2.61 THOUSAND/ÂΜL (ref 0.17–1.22)
MONOCYTES NFR BLD AUTO: 25 % (ref 4–12)
NEUTROPHILS # BLD AUTO: 5.46 THOUSANDS/ÂΜL (ref 1.85–7.62)
NEUTS SEG NFR BLD AUTO: 53 % (ref 43–75)
NRBC BLD AUTO-RTO: 0 /100 WBCS
PLATELET # BLD AUTO: 46 THOUSANDS/UL (ref 149–390)
POTASSIUM SERPL-SCNC: 4.8 MMOL/L (ref 3.5–5.3)
RBC # BLD AUTO: 3.53 MILLION/UL (ref 3.88–5.62)
SODIUM SERPL-SCNC: 142 MMOL/L (ref 135–147)
WBC # BLD AUTO: 10.44 THOUSAND/UL (ref 4.31–10.16)

## 2023-07-07 PROCEDURE — 36415 COLL VENOUS BLD VENIPUNCTURE: CPT

## 2023-07-07 PROCEDURE — 80048 BASIC METABOLIC PNL TOTAL CA: CPT

## 2023-07-07 PROCEDURE — 85025 COMPLETE CBC W/AUTO DIFF WBC: CPT

## 2023-07-13 ENCOUNTER — OFFICE VISIT (OUTPATIENT)
Dept: NEPHROLOGY | Facility: CLINIC | Age: 88
End: 2023-07-13
Payer: MEDICARE

## 2023-07-13 VITALS
SYSTOLIC BLOOD PRESSURE: 138 MMHG | WEIGHT: 105 LBS | HEART RATE: 70 BPM | DIASTOLIC BLOOD PRESSURE: 70 MMHG | HEIGHT: 67 IN | BODY MASS INDEX: 16.48 KG/M2

## 2023-07-13 DIAGNOSIS — N18.9 CHRONIC KIDNEY DISEASE, UNSPECIFIED CKD STAGE: Primary | ICD-10-CM

## 2023-07-13 DIAGNOSIS — Q60.0 SOLITARY KIDNEY, CONGENITAL: ICD-10-CM

## 2023-07-13 PROCEDURE — 99214 OFFICE O/P EST MOD 30 MIN: CPT | Performed by: INTERNAL MEDICINE

## 2023-07-13 NOTE — PROGRESS NOTES
NEPHROLOGY PROGRESS NOTE    Rima Gandhi 80 y.o. male MRN: [id number]  Unit/Bed#:  Encounter: [id number]  Reason for Consult: Chronic kidney disease the patient is here for follow-up    He has bruises on his arms but he states he just wakes up like that he does have chronic thrombocytopenia as well. Other than that he has had no acute changes and no complaints for me. He does report that he has urinary incontinence but feels his bladder is empty. ASSESSMENT/PLAN:  1. Renal    Patient's chronic kidney disease and a solitary kidney congenitally. He has advanced kidney disease as his creatinine is 4.3 but no symptoms of uremia. He has no significant volume overload although he has some pedal edema he is eating okay and really overall feels stable. He would be in agreement with dialysis if and when needed at some point we could discuss an AV access. He has chronic thrombocytopenia and he follows with hematology and has an appointment in the near future and they will be handling his abnormalities. His hemoglobin is 10.4 which has improved since last evaluation. We will continue to monitor labs between visit and prior to visits anatomical me if he develops any symptoms that we discussed that could suggest uremia. SUBJECTIVE:  Review of Systems   Constitutional: Negative for chills, diaphoresis and fever. HENT: Negative. Eyes: Negative. Cardiovascular: Negative for chest pain, dyspnea on exertion, leg swelling and orthopnea. Respiratory: Negative. Negative for cough, shortness of breath, sputum production and wheezing. Gastrointestinal: Negative for abdominal pain, diarrhea, nausea and vomiting. Genitourinary: Negative for dysuria, flank pain, hematuria and incomplete emptying. Neurological: Negative for dizziness, focal weakness, headaches and weakness. Psychiatric/Behavioral: Negative for altered mental status, hallucinations and hypervigilance. The patient is not nervous/anxious. OBJECTIVE:  Current Weight: Weight - Scale: 47.6 kg (105 lb)  [email or web address]:     Blood pressure 138/70, pulse 70, height 5' 7" (1.702 m), weight 47.6 kg (105 lb). , Body mass index is 16.45 kg/m². [unfilled]    Physical Exam: /70 (BP Location: Left arm, Patient Position: Sitting, Cuff Size: Standard)   Pulse 70   Ht 5' 7" (1.702 m)   Wt 47.6 kg (105 lb)   BMI 16.45 kg/m²   Physical Exam  Constitutional:       General: He is not in acute distress. Appearance: He is not toxic-appearing or diaphoretic. HENT:      Head: Normocephalic and atraumatic. Nose: Nose normal.      Mouth/Throat:      Mouth: Mucous membranes are moist.   Eyes:      General: No scleral icterus. Extraocular Movements: Extraocular movements intact. Cardiovascular:      Rate and Rhythm: Normal rate and regular rhythm. Heart sounds: No friction rub. No gallop. Comments: Positive edema. Pulmonary:      Effort: Pulmonary effort is normal. No respiratory distress. Breath sounds: No wheezing, rhonchi or rales. Abdominal:      General: Bowel sounds are normal. There is no distension. Palpations: Abdomen is soft. Tenderness: There is no abdominal tenderness. There is no rebound. Musculoskeletal:      Cervical back: Normal range of motion and neck supple. Neurological:      General: No focal deficit present. Mental Status: He is alert and oriented to person, place, and time. Mental status is at baseline. Comments: No asterixis. Psychiatric:         Mood and Affect: Mood normal.         Behavior: Behavior normal.         Thought Content:  Thought content normal.         Judgment: Judgment normal.         Medications:    Current Outpatient Medications:   •  amLODIPine (NORVASC) 10 mg tablet, Take 1 tablet (10 mg total) by mouth in the morning., Disp: 90 tablet, Rfl: 3  •  atenolol (TENORMIN) 50 mg tablet, Take 50 mg by mouth daily, Disp: , Rfl:   •  atorvastatin (LIPITOR) 10 mg tablet, Take 10 mg by mouth daily, Disp: , Rfl:   •  b complex vitamins tablet, Take 1 tablet by mouth daily, Disp: , Rfl:   •  folic acid (FOLVITE) 1 mg tablet, Take 800 mcg by mouth daily , Disp: , Rfl:   •  Probiotic Product (PROBIOTIC-10 PO), Take 1 tablet by mouth daily, Disp: , Rfl:   •  bacitracin topical ointment 500 units/g topical ointment, Apply 1 large application topically 2 (two) times a day (Patient not taking: Reported on 8/18/2022), Disp: 28 g, Rfl: 0  •  cholecalciferol (VITAMIN D3) 1,000 units tablet, Take 1,000 Units by mouth daily (Patient not taking: Reported on 12/14/2022), Disp: , Rfl:   •  famotidine (PEPCID) 20 mg tablet, Take 1 tablet (20 mg total) by mouth 2 (two) times a day (Patient not taking: Reported on 8/18/2022), Disp: 30 tablet, Rfl: 5  •  pantoprazole (PROTONIX) 40 mg tablet, TAKE 1 TABLET(40 MG) BY MOUTH DAILY (Patient not taking: No sig reported), Disp: 90 tablet, Rfl: 1    Laboratory Results:  Lab Results   Component Value Date    WBC 10.44 (H) 07/07/2023    HGB 10.4 (L) 07/07/2023    HCT 35.0 (L) 07/07/2023    MCV 99 (H) 07/07/2023    PLT 46 (LL) 07/07/2023     Lab Results   Component Value Date    SODIUM 142 07/07/2023    K 4.8 07/07/2023     (H) 07/07/2023    CO2 22 07/07/2023    BUN 54 (H) 07/07/2023    CREATININE 4.31 (H) 07/07/2023    GLUC 85 02/01/2020    CALCIUM 8.4 07/07/2023     Lab Results   Component Value Date    CALCIUM 8.4 07/07/2023    PHOS 4.0 04/02/2019     No results found for: "LABPROT"

## 2023-07-13 NOTE — LETTER
July 13, 2023     Finn Saleh DO  [facility]    Patient: Micheal Jacobo   YOB: 1930   Date of Visit: 7/13/2023       Dear Dr. Gloria Xiong:    Thank you for referring Micheal Jacobo to me for evaluation. Below are my notes for this consultation. If you have questions, please do not hesitate to call me. I look forward to following your patient along with you. Sincerely,        Waylon Martinez MD        CC: No Recipients    Waylon Martinez MD  7/13/2023  7:26 PM  Sign when Signing Visit  NEPHROLOGY PROGRESS NOTE    Micheal Jacobo 80 y.o. male MRN: [id number]  Unit/Bed#:  Encounter: [id number]  Reason for Consult: Chronic kidney disease the patient is here for follow-up    He has bruises on his arms but he states he just wakes up like that he does have chronic thrombocytopenia as well. Other than that he has had no acute changes and no complaints for me. He does report that he has urinary incontinence but feels his bladder is empty. ASSESSMENT/PLAN:  1. Renal    Patient's chronic kidney disease and a solitary kidney congenitally. He has advanced kidney disease as his creatinine is 4.3 but no symptoms of uremia. He has no significant volume overload although he has some pedal edema he is eating okay and really overall feels stable. He would be in agreement with dialysis if and when needed at some point we could discuss an AV access. He has chronic thrombocytopenia and he follows with hematology and has an appointment in the near future and they will be handling his abnormalities. His hemoglobin is 10.4 which has improved since last evaluation. We will continue to monitor labs between visit and prior to visits anatomical me if he develops any symptoms that we discussed that could suggest uremia. SUBJECTIVE:  Review of Systems   Constitutional: Negative for chills, diaphoresis and fever. HENT: Negative. Eyes: Negative.     Cardiovascular: Negative for chest pain, dyspnea on exertion, leg swelling and orthopnea. Respiratory: Negative. Negative for cough, shortness of breath, sputum production and wheezing. Gastrointestinal: Negative for abdominal pain, diarrhea, nausea and vomiting. Genitourinary: Negative for dysuria, flank pain, hematuria and incomplete emptying. Neurological: Negative for dizziness, focal weakness, headaches and weakness. Psychiatric/Behavioral: Negative for altered mental status, hallucinations and hypervigilance. The patient is not nervous/anxious. OBJECTIVE:  Current Weight: Weight - Scale: 47.6 kg (105 lb)  [email or web address]:     Blood pressure 138/70, pulse 70, height 5' 7" (1.702 m), weight 47.6 kg (105 lb). , Body mass index is 16.45 kg/m². [unfilled]    Physical Exam: /70 (BP Location: Left arm, Patient Position: Sitting, Cuff Size: Standard)   Pulse 70   Ht 5' 7" (1.702 m)   Wt 47.6 kg (105 lb)   BMI 16.45 kg/m²   Physical Exam  Constitutional:       General: He is not in acute distress. Appearance: He is not toxic-appearing or diaphoretic. HENT:      Head: Normocephalic and atraumatic. Nose: Nose normal.      Mouth/Throat:      Mouth: Mucous membranes are moist.   Eyes:      General: No scleral icterus. Extraocular Movements: Extraocular movements intact. Cardiovascular:      Rate and Rhythm: Normal rate and regular rhythm. Heart sounds: No friction rub. No gallop. Comments: Positive edema. Pulmonary:      Effort: Pulmonary effort is normal. No respiratory distress. Breath sounds: No wheezing, rhonchi or rales. Abdominal:      General: Bowel sounds are normal. There is no distension. Palpations: Abdomen is soft. Tenderness: There is no abdominal tenderness. There is no rebound. Musculoskeletal:      Cervical back: Normal range of motion and neck supple. Neurological:      General: No focal deficit present.       Mental Status: He is alert and oriented to person, place, and time. Mental status is at baseline. Comments: No asterixis. Psychiatric:         Mood and Affect: Mood normal.         Behavior: Behavior normal.         Thought Content:  Thought content normal.         Judgment: Judgment normal.         Medications:    Current Outpatient Medications:   •  amLODIPine (NORVASC) 10 mg tablet, Take 1 tablet (10 mg total) by mouth in the morning., Disp: 90 tablet, Rfl: 3  •  atenolol (TENORMIN) 50 mg tablet, Take 50 mg by mouth daily, Disp: , Rfl:   •  atorvastatin (LIPITOR) 10 mg tablet, Take 10 mg by mouth daily, Disp: , Rfl:   •  b complex vitamins tablet, Take 1 tablet by mouth daily, Disp: , Rfl:   •  folic acid (FOLVITE) 1 mg tablet, Take 800 mcg by mouth daily , Disp: , Rfl:   •  Probiotic Product (PROBIOTIC-10 PO), Take 1 tablet by mouth daily, Disp: , Rfl:   •  bacitracin topical ointment 500 units/g topical ointment, Apply 1 large application topically 2 (two) times a day (Patient not taking: Reported on 8/18/2022), Disp: 28 g, Rfl: 0  •  cholecalciferol (VITAMIN D3) 1,000 units tablet, Take 1,000 Units by mouth daily (Patient not taking: Reported on 12/14/2022), Disp: , Rfl:   •  famotidine (PEPCID) 20 mg tablet, Take 1 tablet (20 mg total) by mouth 2 (two) times a day (Patient not taking: Reported on 8/18/2022), Disp: 30 tablet, Rfl: 5  •  pantoprazole (PROTONIX) 40 mg tablet, TAKE 1 TABLET(40 MG) BY MOUTH DAILY (Patient not taking: No sig reported), Disp: 90 tablet, Rfl: 1    Laboratory Results:  Lab Results   Component Value Date    WBC 10.44 (H) 07/07/2023    HGB 10.4 (L) 07/07/2023    HCT 35.0 (L) 07/07/2023    MCV 99 (H) 07/07/2023    PLT 46 (LL) 07/07/2023     Lab Results   Component Value Date    SODIUM 142 07/07/2023    K 4.8 07/07/2023     (H) 07/07/2023    CO2 22 07/07/2023    BUN 54 (H) 07/07/2023    CREATININE 4.31 (H) 07/07/2023    GLUC 85 02/01/2020    CALCIUM 8.4 07/07/2023     Lab Results   Component Value Date    CALCIUM 8.4 07/07/2023    PHOS 4.0 04/02/2019     No results found for: "LABPROT"

## 2023-07-13 NOTE — PATIENT INSTRUCTIONS
You are here for follow-up I am glad to hear that you are feeling well and that you feel like you have recovered back to your prior baseline before the hospitalization in March. The creatinine test was 4.3 which is relatively stable. Sounds like you are making a lot of urine and you feel you bladder does empty. At this point again as we discussed I do not feel you have symptoms related to kidney failure which we call uremia so no need for dialysis at this point. Blood pressure was under good control    Labs and follow-up as scheduled and please call me if you have any questions or problems before next visit.

## 2023-10-12 ENCOUNTER — TELEPHONE (OUTPATIENT)
Dept: NEPHROLOGY | Facility: CLINIC | Age: 88
End: 2023-10-12

## 2023-10-12 NOTE — TELEPHONE ENCOUNTER
Dr Alan Henson from [address] [facility] called regarding pt. He stated that the pt was discharged from [facility] and that he needs a sooner follow up than he is scheduled for on 11/6 in [place]. I added pt to wait list and will watch out for any openings. He also advised that Dr Marjorie Garcia may want to run labs in the meantime, as the pt's creatinine is very high (6.58 as of 10/12/23). Please advise.

## 2023-10-12 NOTE — TELEPHONE ENCOUNTER
Called pt and LVM offering a appt with [facility] next Wednesday 10/18 at 3:30pm in [place]. Appt is currently on hold for pt.

## 2023-10-13 NOTE — TELEPHONE ENCOUNTER
Called pt again to offer appointment on 10/18 with [facility] in [address] [place] [id number], [facility]. I will watch for any cancellations with Dr Robyn Chow. Please advise if pt should have labs completed in the meantime.

## 2023-10-17 DIAGNOSIS — N18.4 CHRONIC KIDNEY DISEASE (CKD) STAGE G4/A2, SEVERELY DECREASED GLOMERULAR FILTRATION RATE (GFR) BETWEEN 15-29 ML/MIN/1.73 SQUARE METER AND ALBUMINURIA CREATININE RATIO BETWEEN 30-299 MG/G (HCC): Primary | ICD-10-CM

## 2023-10-17 NOTE — TELEPHONE ENCOUNTER
Message left on patient's VM to have BMP repeated prior to November followup visit per  Dr. Clair Chance. Lab order mailed to pt.

## 2023-11-09 ENCOUNTER — TELEPHONE (OUTPATIENT)
Dept: NEPHROLOGY | Facility: CLINIC | Age: 88
End: 2023-11-09

## 2023-11-09 NOTE — TELEPHONE ENCOUNTER
Pt was  a no show for 11/6 appointment. Dr. Chata Kaur has tried contacting pt - all phone numbers in the chart not working numbers. Called Dr. Tommy Mcfarland office to see if they have any info on pt. They will call back with info, please let Dr. Chata Kaur know.

## 2025-03-25 NOTE — TELEPHONE ENCOUNTER
Doxepin and Anoro inhaler sent for 1 year supply to mail order pharmacy on 3/19/25.   Pt cancelled those with mail order and preferred 30 day supply sent to local pharmacy.   Rx's sent   Patient called to ensure that Dr Olivia Mccormack received his message asking for a call  Confirmed that he is calling to discuss dialysis and that his call back number is [phone]       Patient called back to let Dr Olivia Mccormack know that he is available until 4 today 6/13 and until noon tomorrow 6/14